# Patient Record
Sex: MALE | Race: WHITE | NOT HISPANIC OR LATINO | Employment: UNEMPLOYED | URBAN - METROPOLITAN AREA
[De-identification: names, ages, dates, MRNs, and addresses within clinical notes are randomized per-mention and may not be internally consistent; named-entity substitution may affect disease eponyms.]

---

## 2024-09-03 NOTE — PROGRESS NOTES
Ambulatory Visit  Name: Mike Monge      : 1970      MRN: [id number]  Encounter Provider: Kandy Swain DO  Encounter Date: 2024   Encounter department: [facility] PRIMARY CARE    Assessment & Plan   1. Encounter to establish care with new doctor  2. Colon cancer screening  -     Cologuard  He declines colonoscopy but is agreeable to cologuard.   3. Schizophrenia, unspecified type (HCC)  Assessment & Plan:  Sees psych (cindi santiago) at [facility]  4. Drug abuse in remission (HCC)  5. Alcohol use disorder in remission  Assessment & Plan:  Sober since .   Did AA  6. Mixed hyperlipidemia  Assessment & Plan:  On crestor.   No lab results on file  Will order lipid panel  Orders:  -     Lipid panel  -     Comprehensive metabolic panel  7. Prostate cancer screening  -     PSA Total (Reflex To Free)  8. Screening for thyroid disorder  -     TSH, 3rd generation with Free T4 reflex  9. Screening for deficiency anemia  -     CBC and differential  10. Encounter for immunization  -     Zoster Vac Recomb Adjuvanted (Shingrix) 50 MCG/0.5ML SUSR; Inject 0.5 mL into a muscle once for 1 dose Repeat dose in 2 to 6 months  11. Psoriasis  Assessment & Plan:  Rx for clobetasol sent to his pharmacy.   Orders:  -     clobetasol (TEMOVATE) 0.05 % cream; Apply topically 2 (two) times a day  12. Chronic constipation  Assessment & Plan:  On linzess and this works well for patient.          History of Present Illness     HPI    Patient is a 54 year old male who is being seen today as a new patient to establish care.   We have no records on this individual.   On multiple psychiatric medications including citalopram, benztropine, clonidine, lithium, propanolol, seroquel. Patient unsure of his diagnosis but thinks he has schizophrenia, bipolar disorder and ADHD. Sees psych (Cindi Santiago) at [facility]. Recently moved to this area and is residing at [facility]    Also on crestor so  presuming he has diagnosis of hyperlipidemia.     Previous PCP=Dr. Pina in  [place], [place]. Was reportedly there recently for a PE.     Colon cancer screening never done. He declines colonoscopy. Is agreeable to cologuard.   Immunizations--thinks his adacel is up to date. Never hand shingrix.     Has what he thinks is psoriasis. Gets rough scaly patches on elbows and hands.   Lost his glasses and has difficulty seeing right now.     Decline HIV and hep c screens. Has been screened previously    Review of Systems  History reviewed. No pertinent past medical history.  Past Surgical History:   Procedure Laterality Date   • ANTERIOR CRUCIATE LIGAMENT REPAIR Right 1994   • TONSILLECTOMY Bilateral    • WISDOM TOOTH EXTRACTION Bilateral      History reviewed. No pertinent family history.  Social History     Tobacco Use   • Smoking status: Every Day     Current packs/day: 0.50     Average packs/day: 0.5 packs/day for 40.0 years (20.0 ttl pk-yrs)     Types: Cigarettes     Start date: 9/4/1984   • Smokeless tobacco: Never   Vaping Use   • Vaping status: Former   • Substances: CBD   Substance and Sexual Activity   • Alcohol use: Not Currently     Comment: sober since 2023   • Drug use: Not Currently     Types: Marijuana, Amphetamines   • Sexual activity: Not Currently     Partners: Female     Current Outpatient Medications on File Prior to Visit   Medication Sig   • benztropine (COGENTIN) 1 mg tablet Take 1 mg by mouth daily at bedtime   • citalopram (CeleXA) 20 mg tablet Take 20 mg by mouth daily   • cloNIDine (CATAPRES) 0.1 mg tablet Take 0.1 mg by mouth every 12 (twelve) hours   • lamoTRIgine (LaMICtal) 150 MG tablet Take 150 mg by mouth 2 (two) times a day 1 tab in the am and one at bedtime   • linaCLOtide (Linzess) 145 MCG CAPS Take 145 mcg by mouth in the morning   • lithium 600 MG capsule Take 600 mg by mouth 2 (two) times a day with meals 1 tab in the am and one at bedtime   • propranolol (INDERAL) 40 mg tablet  "Take 40 mg by mouth daily at bedtime   • QUEtiapine (SEROquel) 25 mg tablet Take 25 mg by mouth 2 (two) times a day 1 tab in the am and one at bedtime   • QUEtiapine (SEROquel) 400 MG tablet Take 400 mg by mouth daily at bedtime   • rosuvastatin (CRESTOR) 5 mg tablet Take 5 mg by mouth daily at bedtime   • traZODone (DESYREL) 100 mg tablet Take 100 mg by mouth daily at bedtime     No Known Allergies  Immunization History   Administered Date(s) Administered   • COVID-19 PFIZER VACCINE 0.3 ML IM 04/24/2021, 05/23/2021, 12/29/2021   • COVID-19 Pfizer Vac BIVALENT Juan-sucrose 12 Yr+ IM 12/15/2022   • INFLUENZA 11/10/2008, 10/13/2014, 12/01/2015, 02/01/2017, 09/30/2017, 04/10/2019, 01/02/2020, 10/01/2020, 11/10/2020     Objective     /57   Pulse (!) 47   Ht 6' 4\" (1.93 m)   Wt 116 kg (255 lb)   SpO2 94%   BMI 31.04 kg/m²     Physical Exam  Vitals and nursing note reviewed.   Constitutional:       General: He is not in acute distress.     Appearance: Normal appearance. He is not ill-appearing, toxic-appearing or diaphoretic.   HENT:      Head: Normocephalic and atraumatic.      Right Ear: Tympanic membrane normal.      Left Ear: Tympanic membrane normal.      Nose: Nose normal.      Mouth/Throat:      Mouth: Mucous membranes are moist.      Pharynx: No posterior oropharyngeal erythema.   Eyes:      Conjunctiva/sclera: Conjunctivae normal.   Cardiovascular:      Rate and Rhythm: Normal rate and regular rhythm.      Heart sounds: No murmur heard.  Pulmonary:      Effort: Pulmonary effort is normal.      Breath sounds: Normal breath sounds.   Abdominal:      General: Abdomen is flat. Bowel sounds are normal.      Palpations: Abdomen is soft.   Musculoskeletal:      Cervical back: Normal range of motion and neck supple.      Right lower leg: No edema.      Left lower leg: No edema.   Lymphadenopathy:      Cervical: No cervical adenopathy.   Skin:     General: Skin is warm and dry.      Findings: Rash present.      " Comments: Dry silver plaques extensor surface of elbows and MCP joints   Neurological:      General: No focal deficit present.      Mental Status: He is alert and oriented to person, place, and time.   Psychiatric:      Comments: Affect is flat

## 2024-09-04 ENCOUNTER — OFFICE VISIT (OUTPATIENT)
Dept: FAMILY MEDICINE CLINIC | Facility: CLINIC | Age: 54
End: 2024-09-04
Payer: COMMERCIAL

## 2024-09-04 VITALS
HEART RATE: 47 BPM | DIASTOLIC BLOOD PRESSURE: 57 MMHG | OXYGEN SATURATION: 94 % | SYSTOLIC BLOOD PRESSURE: 119 MMHG | HEIGHT: 76 IN | BODY MASS INDEX: 31.05 KG/M2 | WEIGHT: 255 LBS

## 2024-09-04 DIAGNOSIS — L40.9 PSORIASIS: ICD-10-CM

## 2024-09-04 DIAGNOSIS — Z12.5 PROSTATE CANCER SCREENING: ICD-10-CM

## 2024-09-04 DIAGNOSIS — Z23 ENCOUNTER FOR IMMUNIZATION: ICD-10-CM

## 2024-09-04 DIAGNOSIS — Z76.89 ENCOUNTER TO ESTABLISH CARE WITH NEW DOCTOR: Primary | ICD-10-CM

## 2024-09-04 DIAGNOSIS — Z13.29 SCREENING FOR THYROID DISORDER: ICD-10-CM

## 2024-09-04 DIAGNOSIS — Z12.11 COLON CANCER SCREENING: ICD-10-CM

## 2024-09-04 DIAGNOSIS — F20.9 SCHIZOPHRENIA, UNSPECIFIED TYPE (HCC): ICD-10-CM

## 2024-09-04 DIAGNOSIS — K59.09 CHRONIC CONSTIPATION: ICD-10-CM

## 2024-09-04 DIAGNOSIS — F19.11 DRUG ABUSE IN REMISSION (HCC): ICD-10-CM

## 2024-09-04 DIAGNOSIS — E78.2 MIXED HYPERLIPIDEMIA: ICD-10-CM

## 2024-09-04 DIAGNOSIS — F10.91 ALCOHOL USE DISORDER IN REMISSION: ICD-10-CM

## 2024-09-04 DIAGNOSIS — Z13.0 SCREENING FOR DEFICIENCY ANEMIA: ICD-10-CM

## 2024-09-04 PROBLEM — F31.9 BIPOLAR DISORDER (HCC): Status: ACTIVE | Noted: 2024-09-04

## 2024-09-04 PROCEDURE — 99204 OFFICE O/P NEW MOD 45 MIN: CPT | Performed by: FAMILY MEDICINE

## 2024-09-04 PROCEDURE — 3725F SCREEN DEPRESSION PERFORMED: CPT | Performed by: FAMILY MEDICINE

## 2024-09-04 RX ORDER — BENZTROPINE MESYLATE 1 MG/1
1 TABLET ORAL
COMMUNITY

## 2024-09-04 RX ORDER — TRAZODONE HYDROCHLORIDE 100 MG/1
100 TABLET ORAL
COMMUNITY

## 2024-09-04 RX ORDER — QUETIAPINE FUMARATE 25 MG/1
25 TABLET, FILM COATED ORAL 2 TIMES DAILY
COMMUNITY

## 2024-09-04 RX ORDER — PROPRANOLOL HYDROCHLORIDE 40 MG/1
40 TABLET ORAL
COMMUNITY

## 2024-09-04 RX ORDER — CLONIDINE HYDROCHLORIDE 0.1 MG/1
0.1 TABLET ORAL EVERY 12 HOURS SCHEDULED
COMMUNITY

## 2024-09-04 RX ORDER — CITALOPRAM HYDROBROMIDE 20 MG/1
20 TABLET ORAL DAILY
COMMUNITY

## 2024-09-04 RX ORDER — ZOSTER VACCINE RECOMBINANT, ADJUVANTED 50 MCG/0.5
0.5 KIT INTRAMUSCULAR ONCE
Qty: 1 EACH | Refills: 1 | Status: SHIPPED | OUTPATIENT
Start: 2024-09-04 | End: 2024-09-04

## 2024-09-04 RX ORDER — LAMOTRIGINE 150 MG/1
150 TABLET ORAL 2 TIMES DAILY
COMMUNITY

## 2024-09-04 RX ORDER — QUETIAPINE FUMARATE 400 MG/1
400 TABLET, FILM COATED ORAL
COMMUNITY

## 2024-09-04 RX ORDER — LINACLOTIDE 145 UG/1
145 CAPSULE, GELATIN COATED ORAL DAILY
COMMUNITY

## 2024-09-04 RX ORDER — CLOBETASOL PROPIONATE 0.5 MG/G
CREAM TOPICAL 2 TIMES DAILY
Qty: 45 G | Refills: 1 | Status: SHIPPED | OUTPATIENT
Start: 2024-09-04

## 2024-09-04 RX ORDER — LITHIUM CARBONATE 600 MG/1
600 CAPSULE ORAL 2 TIMES DAILY WITH MEALS
COMMUNITY

## 2024-09-04 RX ORDER — ROSUVASTATIN CALCIUM 5 MG/1
5 TABLET, COATED ORAL
COMMUNITY

## 2024-09-11 ENCOUNTER — TELEPHONE (OUTPATIENT)
Dept: FAMILY MEDICINE CLINIC | Facility: CLINIC | Age: 54
End: 2024-09-11

## 2024-09-11 LAB — COLOGUARD RESULT REPORTABLE: NORMAL

## 2024-09-11 NOTE — TELEPHONE ENCOUNTER
----- Message from Kandy Swain DO sent at 9/11/2024  9:13 AM EDT -----  Please contact patient. His colougard could not be processed as stool sample exceeded allowable weight.   Will be contacted to initiate new sample collection per exact sciences.

## 2024-09-26 ENCOUNTER — TELEPHONE (OUTPATIENT)
Dept: FAMILY MEDICINE CLINIC | Facility: CLINIC | Age: 54
End: 2024-09-26

## 2024-09-26 LAB — COLOGUARD RESULT REPORTABLE: NEGATIVE

## 2024-09-26 NOTE — TELEPHONE ENCOUNTER
VERONICA Agrawal [facility] Clinical  Please notify patient:    Dr. Swain is out of the office this week. Cologuard was negative. Repeat colon cancer screening in 3 years.

## 2024-09-26 NOTE — TELEPHONE ENCOUNTER
Spoke with patient. Aware of result.    ----- Message from VERONICA Brooks sent at 9/26/2024  8:12 AM EDT -----  Please notify patient:    Dr. Swain is out of the office this week. Cologuard was negative. Repeat colon cancer screening in 3 years.   
The patient is a 38y Male complaining of altered mental status.

## 2024-12-04 ENCOUNTER — OFFICE VISIT (OUTPATIENT)
Dept: FAMILY MEDICINE CLINIC | Facility: CLINIC | Age: 54
End: 2024-12-04
Payer: COMMERCIAL

## 2024-12-04 VITALS
RESPIRATION RATE: 17 BRPM | DIASTOLIC BLOOD PRESSURE: 80 MMHG | WEIGHT: 244 LBS | SYSTOLIC BLOOD PRESSURE: 130 MMHG | TEMPERATURE: 98 F | OXYGEN SATURATION: 96 % | BODY MASS INDEX: 29.7 KG/M2 | HEART RATE: 58 BPM

## 2024-12-04 DIAGNOSIS — L40.9 PSORIASIS: Primary | ICD-10-CM

## 2024-12-04 DIAGNOSIS — F17.210 CIGARETTE NICOTINE DEPENDENCE WITHOUT COMPLICATION: ICD-10-CM

## 2024-12-04 DIAGNOSIS — F17.210 SMOKING GREATER THAN 20 PACK YEARS: ICD-10-CM

## 2024-12-04 PROCEDURE — G2211 COMPLEX E/M VISIT ADD ON: HCPCS | Performed by: FAMILY MEDICINE

## 2024-12-04 PROCEDURE — 99213 OFFICE O/P EST LOW 20 MIN: CPT | Performed by: FAMILY MEDICINE

## 2024-12-04 RX ORDER — QUETIAPINE FUMARATE 100 MG/1
100 TABLET, FILM COATED ORAL
COMMUNITY
Start: 2024-11-11

## 2024-12-04 NOTE — ASSESSMENT & PLAN NOTE
Currently using clobetasol cream and ointment with minimal improvement  Will refer him to derm for ? Biologic therapy  Orders:    Ambulatory Referral to Dermatology; Future

## 2024-12-04 NOTE — PROGRESS NOTES
Name: Mike Monge      : 1970      MRN: [id number]  Encounter Provider: Kandy Swain DO  Encounter Date: 2024   Encounter department: [facility] PRIMARY CARE    Assessment & Plan  Psoriasis  Currently using clobetasol cream and ointment with minimal improvement  Will refer him to derm for ? Biologic therapy  Orders:    Ambulatory Referral to Dermatology; Future         History of Present Illness     HPI  Patient is a 54 year old male with schizophrenia, bipolar disorder, drug abuse in remission, alcohol abuse in remission, hyperlipidemia, and chronic constipation who is being seen today for psoriasis.     Seen here as a new patient on 24.   Lab testing ordered but have not been completed.     Was given rx for clobetasol for his psoriasis. Was using it and states that it really did not help all that much. His psoriasis is located on bilateral elbows, left greater than right and on dorsum of bilateral hands. No rash on buttocks or legs.     No joint pain at all.     Review of Systems  Past Medical History:   Diagnosis Date    Alcohol abuse, in remission     Bipolar disorder (HCC)     Chronic constipation     Drug abuse in remission (HCC)     Hyperlipidemia     Psoriasis     Schizophrenia (HCC)      Past Surgical History:   Procedure Laterality Date    ANTERIOR CRUCIATE LIGAMENT REPAIR Right 1994    TONSILLECTOMY Bilateral     WISDOM TOOTH EXTRACTION Bilateral      History reviewed. No pertinent family history.  Social History     Tobacco Use    Smoking status: Every Day     Current packs/day: 0.50     Average packs/day: 0.5 packs/day for 40.2 years (20.1 ttl pk-yrs)     Types: Cigarettes     Start date: 1984    Smokeless tobacco: Never   Vaping Use    Vaping status: Former    Substances: CBD   Substance and Sexual Activity    Alcohol use: Not Currently     Comment: sober since     Drug use: Not Currently     Types: Marijuana, Amphetamines    Sexual activity: Not  Currently     Partners: Female     Current Outpatient Medications on File Prior to Visit   Medication Sig    benztropine (COGENTIN) 1 mg tablet Take 1 mg by mouth daily at bedtime    citalopram (CeleXA) 20 mg tablet Take 20 mg by mouth daily    cloNIDine (CATAPRES) 0.1 mg tablet Take 0.1 mg by mouth every 12 (twelve) hours    lamoTRIgine (LaMICtal) 150 MG tablet Take 150 mg by mouth 2 (two) times a day 1 tab in the am and one at bedtime    lithium 600 MG capsule Take 600 mg by mouth 2 (two) times a day with meals 1 tab in the am and one at bedtime    propranolol (INDERAL) 40 mg tablet Take 40 mg by mouth daily at bedtime    QUEtiapine (SEROquel) 100 mg tablet Take 100 mg by mouth daily at bedtime    QUEtiapine (SEROquel) 400 MG tablet Take 400 mg by mouth daily at bedtime    rosuvastatin (CRESTOR) 5 mg tablet Take 5 mg by mouth daily at bedtime    [DISCONTINUED] clobetasol (TEMOVATE) 0.05 % cream Apply topically 2 (two) times a day (Patient not taking: Reported on 12/4/2024)    [DISCONTINUED] linaCLOtide (Linzess) 145 MCG CAPS Take 1 capsule (145 mcg total) by mouth in the morning (Patient not taking: Reported on 12/4/2024)    [DISCONTINUED] QUEtiapine (SEROquel) 25 mg tablet Take 25 mg by mouth 2 (two) times a day 1 tab in the am and one at bedtime    [DISCONTINUED] traZODone (DESYREL) 100 mg tablet Take 100 mg by mouth daily at bedtime (Patient not taking: Reported on 12/4/2024)     Allergies   Allergen Reactions    Hydroxyzine Abdominal Pain and GI Intolerance    Erythromycin Rash     Immunization History   Administered Date(s) Administered    COVID-19 PFIZER VACCINE 0.3 ML IM 04/24/2021, 05/23/2021, 12/29/2021    COVID-19 Pfizer Vac BIVALENT Juan-sucrose 12 Yr+ IM 12/15/2022    COVID-19 Pfizer Vac BIVALENT Juan-sucrose 5 yr-11 yr IM 12/16/2022    INFLUENZA 11/10/2008, 10/13/2014, 12/01/2015, 02/01/2017, 09/30/2017, 04/10/2019, 01/02/2020, 10/01/2020, 11/10/2020    Influenza, seasonal, injectable, preservative  free 10/28/2024    Tuberculin Skin Test-PPD Intradermal 05/19/2021, 07/24/2024    Zoster Vaccine Recombinant 11/11/2024     Objective   /84 (BP Location: Left arm, Patient Position: Sitting, Cuff Size: Adult)   Pulse 58   Temp 98 °F (36.7 °C) (Tympanic)   Resp 17   Wt 111 kg (244 lb)   SpO2 96%   BMI 29.70 kg/m²     Physical Exam  Vitals and nursing note reviewed.   Constitutional:       General: He is not in acute distress.     Appearance: Normal appearance. He is not ill-appearing, toxic-appearing or diaphoretic.   Cardiovascular:      Rate and Rhythm: Normal rate and regular rhythm.      Heart sounds: No murmur heard.  Pulmonary:      Effort: Pulmonary effort is normal.      Breath sounds: Normal breath sounds.   Musculoskeletal:      Cervical back: Normal range of motion and neck supple.      Right lower leg: No edema.      Left lower leg: No edema.   Lymphadenopathy:      Cervical: No cervical adenopathy.   Skin:     Comments: Slivery scales and plaques bilateral elbows, extensor surfaces, and dorsum bilateral hands.    Psychiatric:         Mood and Affect: Mood normal.

## 2024-12-05 ENCOUNTER — TELEPHONE (OUTPATIENT)
Dept: ADMINISTRATIVE | Facility: OTHER | Age: 54
End: 2024-12-05

## 2024-12-05 NOTE — TELEPHONE ENCOUNTER
----- Message from Angela HOOD sent at 12/4/2024  2:19 PM EST -----  Regarding: Medicare Wellness HM Update  12/04/24 2:19 PM    Hello, our patient Mike Monge has had Medicare AWV completed/performed. Please assist in updating the patient chart by pulling the Care Everywhere (CE) document. The date of service is 5/30/2024.     Thank you,  Angela Simeon MA  [facility] PRIMARY CARE

## 2024-12-07 LAB
ALBUMIN SERPL-MCNC: 5 G/DL (ref 3.6–5.1)
ALBUMIN/GLOB SERPL: 1.8 (CALC) (ref 1–2.5)
ALP SERPL-CCNC: 56 U/L (ref 35–144)
ALT SERPL-CCNC: 10 U/L (ref 9–46)
AST SERPL-CCNC: 12 U/L (ref 10–35)
BASOPHILS # BLD AUTO: 47 CELLS/UL (ref 0–200)
BASOPHILS NFR BLD AUTO: 0.5 %
BILIRUB SERPL-MCNC: 0.7 MG/DL (ref 0.2–1.2)
BUN SERPL-MCNC: 16 MG/DL (ref 7–25)
BUN/CREAT SERPL: ABNORMAL (CALC) (ref 6–22)
CALCIUM SERPL-MCNC: 10.3 MG/DL (ref 8.6–10.3)
CHLORIDE SERPL-SCNC: 103 MMOL/L (ref 98–110)
CHOLEST SERPL-MCNC: 153 MG/DL
CHOLEST/HDLC SERPL: 4.9 (CALC)
CO2 SERPL-SCNC: 25 MMOL/L (ref 20–32)
CREAT SERPL-MCNC: 0.98 MG/DL (ref 0.7–1.3)
EOSINOPHIL # BLD AUTO: 177 CELLS/UL (ref 15–500)
EOSINOPHIL NFR BLD AUTO: 1.9 %
ERYTHROCYTE [DISTWIDTH] IN BLOOD BY AUTOMATED COUNT: 12 % (ref 11–15)
GFR/BSA.PRED SERPLBLD CYS-BASED-ARV: 92 ML/MIN/1.73M2
GLOBULIN SER CALC-MCNC: 2.8 G/DL (CALC) (ref 1.9–3.7)
GLUCOSE SERPL-MCNC: 104 MG/DL (ref 65–99)
HCT VFR BLD AUTO: 47.7 % (ref 38.5–50)
HDLC SERPL-MCNC: 31 MG/DL
HGB BLD-MCNC: 15.6 G/DL (ref 13.2–17.1)
LDLC SERPL CALC-MCNC: 97 MG/DL (CALC)
LYMPHOCYTES # BLD AUTO: 2390 CELLS/UL (ref 850–3900)
LYMPHOCYTES NFR BLD AUTO: 25.7 %
MCH RBC QN AUTO: 29.2 PG (ref 27–33)
MCHC RBC AUTO-ENTMCNC: 32.7 G/DL (ref 32–36)
MCV RBC AUTO: 89.3 FL (ref 80–100)
MONOCYTES # BLD AUTO: 809 CELLS/UL (ref 200–950)
MONOCYTES NFR BLD AUTO: 8.7 %
NEUTROPHILS # BLD AUTO: 5878 CELLS/UL (ref 1500–7800)
NEUTROPHILS NFR BLD AUTO: 63.2 %
NONHDLC SERPL-MCNC: 122 MG/DL (CALC)
PLATELET # BLD AUTO: 314 THOUSAND/UL (ref 140–400)
PMV BLD REES-ECKER: 10.3 FL (ref 7.5–12.5)
POTASSIUM SERPL-SCNC: 4.3 MMOL/L (ref 3.5–5.3)
PROT SERPL-MCNC: 7.8 G/DL (ref 6.1–8.1)
PSA SERPL-MCNC: 0.8 NG/ML
RBC # BLD AUTO: 5.34 MILLION/UL (ref 4.2–5.8)
SODIUM SERPL-SCNC: 138 MMOL/L (ref 135–146)
TRIGL SERPL-MCNC: 149 MG/DL
TSH SERPL-ACNC: 1.5 MIU/L (ref 0.4–4.5)
WBC # BLD AUTO: 9.3 THOUSAND/UL (ref 3.8–10.8)

## 2024-12-09 ENCOUNTER — RESULTS FOLLOW-UP (OUTPATIENT)
Dept: FAMILY MEDICINE CLINIC | Facility: CLINIC | Age: 54
End: 2024-12-09

## 2024-12-09 ENCOUNTER — TELEPHONE (OUTPATIENT)
Age: 54
End: 2024-12-09

## 2024-12-09 DIAGNOSIS — E78.2 MIXED HYPERLIPIDEMIA: Primary | ICD-10-CM

## 2024-12-09 RX ORDER — ROSUVASTATIN CALCIUM 5 MG/1
5 TABLET, COATED ORAL
Qty: 90 TABLET | Refills: 1 | Status: SHIPPED | OUTPATIENT
Start: 2024-12-09

## 2024-12-09 NOTE — TELEPHONE ENCOUNTER
ALEJANDRO Swain,    Pharmacy called [facility] for maintenance med. Refills for the patient. Rula stated since Patient is seeing Dr. Swain now the refills needs to come from her office.    Thank you!!

## 2024-12-09 NOTE — TELEPHONE ENCOUNTER
Reason for call:   [x] Refill   [] Prior Auth  [] Other:     Office: [facility] Primary Care  [x] PCP/Provider - Kandy Swain   [] Specialty/Provider -     Medication: rosuvastatin     Dose/Frequency: 5 mg/ daily     Quantity: 30 day supply     Pharmacy: [facility] in [place]     Does the patient have enough for 3 days?   [] Yes   [x] No - Send as HP to POD

## 2024-12-10 RX ORDER — TRAZODONE HYDROCHLORIDE 100 MG/1
100 TABLET ORAL
COMMUNITY
Start: 2024-12-06 | End: 2024-12-12 | Stop reason: SDUPTHER

## 2024-12-10 RX ORDER — HYDROXYZINE PAMOATE 25 MG/1
25 CAPSULE ORAL 3 TIMES DAILY PRN
COMMUNITY
Start: 2024-12-06

## 2024-12-10 NOTE — TELEPHONE ENCOUNTER
Pt aware of results, verbalized understanding and requested for results to be sent by mail.    ----- Message from Kandy Swain DO sent at 12/9/2024  3:04 PM EST -----  Can let patient know that his labs looked good overall.   Cholesterol was good at 153. HDL (good cholesterol) was a little low for which exercise is recommended.   Blood sugar slightly elevated at 104 and recommend watching sweets and simple carbs in diet.

## 2024-12-10 NOTE — TELEPHONE ENCOUNTER
This patient does not have diagnosis of hypertension  Reviewed previous PCP note  He is on inderal LA as prescribed by previous PCP for his tremor. This medication is a beta blocker which can be used for blood pressure but that is not why he is on it.   He is also on clonidine (presumably for anxiety) which can also be used for blood pressure but again, no diagnosis of hypertension on chart     Please find out what medication he needs by name.

## 2024-12-11 NOTE — TELEPHONE ENCOUNTER
Upon review of the In Basket request we were able to locate, review, and update the patient chart as requested for Medicare AW.    Any additional questions or concerns should be emailed to the Practice Liaisons via the appropriate education email address, please do not reply via In Basket.    Thank you  Michael Gomez MA   [facility]

## 2024-12-12 ENCOUNTER — TELEPHONE (OUTPATIENT)
Age: 54
End: 2024-12-12

## 2024-12-12 DIAGNOSIS — G47.00 INSOMNIA, UNSPECIFIED TYPE: Primary | ICD-10-CM

## 2024-12-12 RX ORDER — TRAZODONE HYDROCHLORIDE 100 MG/1
100 TABLET ORAL
Start: 2024-12-12

## 2024-12-12 NOTE — TELEPHONE ENCOUNTER
Pt was contacted regarding concerns with blood pressure medication, pt state that if Dr. Swain is not concerned that he does not need them.

## 2024-12-12 NOTE — TELEPHONE ENCOUNTER
Pt was notified that his prescription will have to come from his psych provider. Pt expressed frustration regarding this message. Pt is aware that he should contact psych provider for refills.

## 2024-12-12 NOTE — TELEPHONE ENCOUNTER
It was removed because he noted that he was NOT TAKING  at time of visit.   If indeed he is still taking will make sure it gets put back on med list.

## 2024-12-12 NOTE — TELEPHONE ENCOUNTER
Pt was contacted to discuss refill for Trazadone, pt stated that he really needs this medication because he takes it every night.

## 2024-12-12 NOTE — TELEPHONE ENCOUNTER
Just to clarify, he should continue his inderal LA and clonidine both of which are being prescribed for him but not for diagnosis of hypertension.

## 2024-12-12 NOTE — TELEPHONE ENCOUNTER
Patient called the RX Refill Line. Message is being forwarded to the office.     Patient is concerned because his after visit summary from his appointment with Dr. Swain on 12/04/24 states to STOP taking trazodone. He said he's been on this medication since his late twenties and needs to stay on it for sleep. Patient doesn't recall discussing this medication with Dr. Swain and it is managed by his current psychiatrist.    Patient is requesting a follow-up phone call to discuss. Please contact patient at phone #[phone]

## 2024-12-16 ENCOUNTER — HOSPITAL ENCOUNTER (OUTPATIENT)
Dept: CT IMAGING | Facility: HOSPITAL | Age: 54
Discharge: HOME | End: 2024-12-16
Attending: FAMILY MEDICINE

## 2024-12-16 DIAGNOSIS — F17.210 SMOKING GREATER THAN 20 PACK YEARS: ICD-10-CM

## 2024-12-16 DIAGNOSIS — F17.210 CIGARETTE NICOTINE DEPENDENCE WITHOUT COMPLICATION: ICD-10-CM

## 2024-12-23 ENCOUNTER — RESULTS FOLLOW-UP (OUTPATIENT)
Dept: FAMILY MEDICINE CLINIC | Facility: CLINIC | Age: 54
End: 2024-12-23

## 2024-12-23 DIAGNOSIS — F17.210 SMOKING GREATER THAN 20 PACK YEARS: Primary | ICD-10-CM

## 2024-12-23 DIAGNOSIS — I77.810 THORACIC AORTIC ECTASIA (HCC): ICD-10-CM

## 2024-12-23 NOTE — TELEPHONE ENCOUNTER
Pt made aware of test results and recommendation, pt verbalized understanding.     ----- Message from Kandy Swain DO sent at 12/23/2024 12:00 PM EST -----  Let patient know that his CT done for lung cancer screening did not show any lung nodules   He did however have slight dilation of the thoracic aorta at 4.3 cm. Repeat imaging in one year is recommended.

## 2025-04-14 ENCOUNTER — TELEPHONE (OUTPATIENT)
Dept: PSYCHIATRY | Facility: CLINIC | Age: 55
End: 2025-04-14

## 2025-04-14 ENCOUNTER — TELEPHONE (OUTPATIENT)
Age: 55
End: 2025-04-14

## 2025-04-14 NOTE — TELEPHONE ENCOUNTER
LVM regarding getting set up for NP talk therapy and psychiatry.  Clt resides at [facility].

## 2025-04-14 NOTE — TELEPHONE ENCOUNTER
Patient returned call to speak with Barbara Jack regarding an appointment. Writer transferred call and LVM to call Pt back. [facility] also sent to Barbara.

## 2025-04-15 ENCOUNTER — TELEPHONE (OUTPATIENT)
Dept: PSYCHIATRY | Facility: CLINIC | Age: 55
End: 2025-04-15

## 2025-04-15 NOTE — TELEPHONE ENCOUNTER
"Behavioral Health Outpatient Intake Questions    Referred By   : Eric Romo    Please advise interviewee that they need to answer all questions truthfully to allow for best care, and any misrepresentations of information may affect their ability to be seen at this clinic   => Was this discussed? Yes     If Minor Child (under age 18)    Who is/are the legal guardian(s) of the child?     Is there a custody agreement? No     If \"YES\"- Custody orders must be obtained prior to scheduling the first appointment  In addition, Consent to Treatment must be signed by all legal guardians prior to scheduling the first appointment    If \"NO\"- Consent to Treatment must be signed by all legal guardians prior to scheduling the first appointment    Behavioral Health Outpatient Intake History -     Presenting Problem (in patient's own words): Follow up for med mgmt and therapy.  Moved into [facility]    Are there any communication barriers for this patient?     No                                               If yes, please describe barriers:   If there is a unique situation, please refer to Bryant Lepe/Lori Cortez for final determination.    Are you taking any psychiatric medications? Yes     If \"YES\" -What are they will bring a list    If \"YES\" -Who prescribes? [facility]    Has the Patient previously received outpatient Talk Therapy or Medication Management from [facility]  No        If \"YES\"- When, Where and with Whom?         If \"NO\" -Has Patient received these services elsewhere?       If \"YES\" -When, Where, and with Whom?    Has the Patient abused alcohol or other substances in the last 6 months ? No       If \"YES\" -What substance, How much, How often?     If illegal substance: Refer to [facility] (for LUIS A) or [facility].   If Alcohol in excess of 10 drinks per week:  Refer to [facility] (for LUIS A) or [facility]    Legal History-     Is this treatment court ordered? No   If \"yes \"send to :  Talk Therapy " ": Send to Bryant Lepe for final determination   Med Management: Send to Dr. Kramer for final determination     Has the Patient been convicted of a felony?  No   If \"Yes\" send to -When, What?  Talk Therapy: Send to Bryant Lepe for final determination   Med Management: Send to Dr. Kramer for final determination     ACCEPTED as a patient Yes  If \"Yes\" Appointment Date: 4/17/25    Referred Elsewhere? No  If “Yes” - (Where? Ex: [facility], [facility], [facility], [facility], etc.)   Name of Insurance Co:  [employer]are   Insurance ID#  Insurance Phone #  If ins is primary or secondary?  If patient is a minor, parents information such as Name, D.O.B of guarantor.  "

## 2025-04-17 ENCOUNTER — TELEPHONE (OUTPATIENT)
Dept: PSYCHIATRY | Facility: CLINIC | Age: 55
End: 2025-04-17

## 2025-04-17 ENCOUNTER — OFFICE VISIT (OUTPATIENT)
Dept: BEHAVIORAL/MENTAL HEALTH CLINIC | Facility: CLINIC | Age: 55
End: 2025-04-17
Payer: COMMERCIAL

## 2025-04-17 DIAGNOSIS — F20.9 SCHIZOPHRENIA, UNSPECIFIED TYPE (HCC): Primary | ICD-10-CM

## 2025-04-17 PROCEDURE — 90791 PSYCH DIAGNOSTIC EVALUATION: CPT | Performed by: COUNSELOR

## 2025-04-17 NOTE — TELEPHONE ENCOUNTER
Client called for directions from [facility]. Client knows where the [facility] is, writer informed client our building is to the left in the big parking lot that is on the left side of the driveway going past [facility].    Client asked in relation to the Rehab. Writer told client that building was continuing past the [facility] to the right.
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
## 2025-04-21 ENCOUNTER — TELEPHONE (OUTPATIENT)
Age: 55
End: 2025-04-21

## 2025-04-21 NOTE — TELEPHONE ENCOUNTER
Mike Monge and/or patient requested a call back to discuss patient stated he has some issues he would like to discuss with provider and would like a call back as soon as possible.    They can be reached at P# [phone].       Thank you.

## 2025-04-22 ENCOUNTER — TELEPHONE (OUTPATIENT)
Dept: BEHAVIORAL/MENTAL HEALTH CLINIC | Facility: CLINIC | Age: 55
End: 2025-04-22

## 2025-04-22 ENCOUNTER — TELEPHONE (OUTPATIENT)
Dept: PSYCHIATRY | Facility: CLINIC | Age: 55
End: 2025-04-22

## 2025-04-22 NOTE — TELEPHONE ENCOUNTER
One week follow up call for New Patient appointment with Kitty Miranda [[id number]] on 5/15/25  was made on 4/15/25. Writer informed patient of New Patient paperwork needing to be completed 5 days prior to the appointment. Writer confirmed paperwork has been sent via Mailed.

## 2025-05-08 ENCOUNTER — OFFICE VISIT (OUTPATIENT)
Dept: BEHAVIORAL/MENTAL HEALTH CLINIC | Facility: CLINIC | Age: 55
End: 2025-05-08
Payer: COMMERCIAL

## 2025-05-08 DIAGNOSIS — F10.91 ALCOHOL USE DISORDER IN REMISSION: ICD-10-CM

## 2025-05-08 DIAGNOSIS — F19.11 DRUG ABUSE IN REMISSION (HCC): ICD-10-CM

## 2025-05-08 DIAGNOSIS — F20.9 SCHIZOPHRENIA, UNSPECIFIED TYPE (HCC): Primary | ICD-10-CM

## 2025-05-08 PROCEDURE — 90834 PSYTX W PT 45 MINUTES: CPT | Performed by: COUNSELOR

## 2025-05-08 NOTE — BH CRISIS PLAN
Client Name: Mike Monge       Client YOB: 1970    Jozef Safety Plan      Creation Date: 5/8/25 Update Date: 5/8/26   Created By: Ginette Peres Last Updated By: Ginette Peres      Step 1: Warning Signs:   Warning Signs   anger   voices   isolate            Step 2: Internal Coping Strategies:   Internal Coping Strategies   isloate   smoke cigarette   walks            Step 3: People and social settings that provide distraction:   Name Contact Information   my old partner cell phone in phone    Places   none           Step 4: People whom I can ask for help during a crisis:     Patient did not identify any contacts: Yes      Step 5: Professionals or agencies I can contact during a crisis:      Clinican/Agency Name Phone Emergency Contact    [facility] [phone]       [facility] Emergency Department Emergency Department Phone Emergency Department Address    [facility] [phone]         Crisis Phone Numbers:   Suicide Prevention Lifeline: Call or Text  470 Crisis Text Line: Text HOME to 227-071   Please note: Some [facility] do not have a separate number for Child/Adolescent specific crisis. If your county is not listed under Child/Adolescent, please call the adult number for your county      Adult Crisis Numbers: Child/Adolescent Crisis Numbers   [facility]: [phone] [facility]: [phone]   [facility]: [phone] [facility]: [phone]   [facility]: [phone] [place]: [phone]   [facility]: [phone] [facility]: [phone]   [facility]: [phone]   [facility]: [phone]   [facility]: [phone]   [facility]: [phone] (daytime) [phone] (after hours, weekends, holidays)      Step 6: Making the environment safer (plan for lethal means safety):   Patient did not identify any lethal methods: Yes     Optional: What is most important to me and worth living for?   My  strength     Jozef Safety Plan. Alexia Sellers and Parag Owusu. Used with permission of the authors.

## 2025-05-08 NOTE — PSYCH
"Behavioral Health Psychotherapy Progress Note    Psychotherapy Provided: Individual Psychotherapy     1. Schizophrenia, unspecified type (HCC)        2. 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 Alcohol use disorder in remission            Goals addressed in session: Goal 1     DATA: Client endorses aggravation with new roommate and processed feelings. Client endorses hearing voices telling him he's going to get in trouble for using his friends food stamp card. Client is struggling to prove that these voices exist. Client is observed to be emotionally dysregulated in thoughts, shaking hands and spiraling feelings of paranoia.  Client declines to do a PHQ but is agreeable to work on a treatment plan. Client processed on how hard his living situation is and that hearing these voices makes it worse.  Client wishes that the voices would go away and stop bothering him. Client processed on his sobriety and fearing he will relapse due to the dysregulation.  Client is hopeful medications will be help him.  Client is somewhat receptive to challenging voices with facts or using mantras.    During this session, this clinician used the following therapeutic modalities: Mindfulness-based Strategies and CTR    Substance Abuse was addressed during this session. If the client is diagnosed with a co-occurring substance use disorder, please indicate any changes in the frequency or amount of use: NONE. Stage of change for addressing substance use diagnoses: Maintenance    ASSESSMENT:  Mike Monge presents with a Anxious mood.     his affect is Normal range and intensity and Tearful, which is congruent, with his mood and the content of the session. The client has made progress on their goals.    Client created goals today.  Mike Monge presents with a none risk of suicide, none risk of self-harm, and none risk of harm to others.    For any risk assessment that surpasses a \"low\" rating, a safety plan must be developed.    A safety " "plan was indicated: no  If yes, describe in detail NA    PLAN: Between sessions, Mike oMnge will try using \"no news is good news\" mantra. At the next session, the therapist will use Client-centered Therapy, Mindfulness-based Strategies, and CTR  to address schizophrenia, depression.    Behavioral Health Treatment Plan and Discharge Planning: Mike Monge is aware of and agrees to continue to work on their treatment plan. They have identified and are working toward their discharge goals. yes    Depression Follow-up Plan Completed: Not applicable    Visit start and stop times:    05/08/25  Start Time: 1200  Stop Time: 1248  Total Visit Time: 48 minutes  "

## 2025-05-08 NOTE — BH TREATMENT PLAN
"Outpatient Behavioral Health Psychotherapy Treatment Plan    Mike Monge  1970     Date of Initial Psychotherapy Assessment: 4/17/25   Date of Current Treatment Plan: 05/08/25  Treatment Plan Target Date: TBD  Treatment Plan Expiration Date: 11/17/25    Diagnosis:   1. Schizophrenia, unspecified type (HCC)        2. 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 Alcohol use disorder in remission            Area(s) of Need: Sobriety,  paranoia, emotional regulation, skills    Long Term Goal 1 (in the client's own words): I want to get rid of the baggage in life and get back to my life.  I want to learn how to \"be me\" again.     Stage of Change: Action    Target Date for completion: TBD     Anticipated therapeutic modalities: DBT skills, CTR, Mindfulness     People identified to complete this goal: Mike Monge, Ginette Peres, PHILW      Objective 1: (identify the means of measuring success in meeting the objective): I would like to get a new apartment.      Objective 2: (identify the means of measuring success in meeting the objective): In 6 months I will learn 3 new emotional regulation skills.       I am currently under the care of a [facility] psychiatric provider: no    My [facility] psychiatric provider is: None    I am currently taking psychiatric medications:  yes as directed.     I feel that I will be ready for discharge from mental health care when I reach the following (measurable goal/objective): Once my life feels more in line with what I want.     For children and adults who have a legal guardian:   Has there been any change to custody orders and/or guardianship status? NA. If yes, attach updated documentation.    I have created my Crisis Plan and have been offered a copy of this plan    Behavioral Health Treatment Plan  Luke: Diagnosis and Treatment Plan explained to Mike Monge acknowledges an understanding of their diagnosis. Mike Monge agrees to this treatment plan.    I " have been offered a copy of this Treatment Plan. yes

## 2025-05-09 PROBLEM — H90.3 SENSORINEURAL HEARING LOSS (SNHL) OF BOTH EARS: Status: ACTIVE | Noted: 2021-03-09

## 2025-05-09 PROBLEM — F51.01 PRIMARY INSOMNIA: Status: ACTIVE | Noted: 2021-03-09

## 2025-05-09 PROBLEM — G47.33 OBSTRUCTIVE SLEEP APNEA SYNDROME: Status: ACTIVE | Noted: 2022-01-04

## 2025-05-09 PROBLEM — M47.816 SPONDYLOSIS OF LUMBAR SPINE: Status: ACTIVE | Noted: 2021-03-09

## 2025-05-09 PROBLEM — Z71.89 ADVANCED CARE PLANNING/COUNSELING DISCUSSION: Status: ACTIVE | Noted: 2022-01-05

## 2025-05-09 PROBLEM — D45 POLYCYTHEMIA VERA (HCC): Chronic | Status: ACTIVE | Noted: 2021-03-09

## 2025-05-09 PROBLEM — M19.042 PRIMARY OSTEOARTHRITIS OF BOTH HANDS: Status: ACTIVE | Noted: 2021-03-09

## 2025-05-09 PROBLEM — K59.09 CHRONIC CONSTIPATION: Status: ACTIVE | Noted: 2023-07-03

## 2025-05-09 PROBLEM — R40.0 DAYTIME SOMNOLENCE: Status: ACTIVE | Noted: 2021-03-09

## 2025-05-09 PROBLEM — F31.62 BIPOLAR DISORDER, CURRENT EPISODE MIXED, MODERATE (HCC): Chronic | Status: ACTIVE | Noted: 2021-03-09

## 2025-05-09 PROBLEM — R06.83 PRIMARY SNORING: Status: ACTIVE | Noted: 2021-03-09

## 2025-05-09 PROBLEM — I10 BENIGN ESSENTIAL HYPERTENSION: Status: ACTIVE | Noted: 2021-03-09

## 2025-05-09 PROBLEM — R06.81 WITNESSED EPISODE OF APNEA: Status: ACTIVE | Noted: 2021-03-09

## 2025-05-09 PROBLEM — H02.66 XANTHELASMA OF EYELID, BILATERAL: Status: ACTIVE | Noted: 2023-07-03

## 2025-05-09 PROBLEM — L40.8 OTHER PSORIASIS: Status: ACTIVE | Noted: 2023-07-03

## 2025-05-09 PROBLEM — R73.01 IMPAIRED FASTING GLUCOSE: Status: ACTIVE | Noted: 2021-03-09

## 2025-05-09 PROBLEM — F17.210 CIGARETTE NICOTINE DEPENDENCE WITHOUT COMPLICATION: Status: ACTIVE | Noted: 2022-01-05

## 2025-05-09 PROBLEM — I83.812 VARICOSE VEINS OF LEFT LOWER EXTREMITY WITH PAIN: Status: ACTIVE | Noted: 2022-01-05

## 2025-05-09 PROBLEM — N52.9 ERECTILE DYSFUNCTION: Status: ACTIVE | Noted: 2021-03-09

## 2025-05-09 PROBLEM — F32.1 CURRENT MODERATE EPISODE OF MAJOR DEPRESSIVE DISORDER (HCC): Status: ACTIVE | Noted: 2021-03-09

## 2025-05-09 PROBLEM — F19.11 HISTORY OF DRUG ABUSE (HCC): Status: ACTIVE | Noted: 2022-01-05

## 2025-05-09 PROBLEM — Z12.11 COLON CANCER SCREENING: Status: ACTIVE | Noted: 2023-01-09

## 2025-05-09 PROBLEM — R61 CHRONIC NIGHT SWEATS: Status: ACTIVE | Noted: 2022-01-05

## 2025-05-09 PROBLEM — H02.63 XANTHELASMA OF EYELID, BILATERAL: Status: ACTIVE | Noted: 2023-07-03

## 2025-05-09 PROBLEM — Z72.52 HIGH RISK HOMOSEXUAL BEHAVIOR: Status: ACTIVE | Noted: 2021-03-09

## 2025-05-09 PROBLEM — M19.041 PRIMARY OSTEOARTHRITIS OF BOTH HANDS: Status: ACTIVE | Noted: 2021-03-09

## 2025-05-09 PROBLEM — M47.812 OSTEOARTHRITIS OF CERVICAL SPINE: Status: ACTIVE | Noted: 2021-03-09

## 2025-05-09 PROBLEM — F29 PSYCHOSIS (HCC): Status: ACTIVE | Noted: 2020-03-07

## 2025-05-09 PROBLEM — Z00.01 ENCOUNTER FOR GENERAL ADULT MEDICAL EXAMINATION WITH ABNORMAL FINDINGS: Status: ACTIVE | Noted: 2022-01-05

## 2025-05-09 RX ORDER — TRAZODONE HYDROCHLORIDE 150 MG/1
TABLET ORAL
COMMUNITY
Start: 2025-04-18 | End: 2025-05-15 | Stop reason: SDUPTHER
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
## 2025-05-15 ENCOUNTER — TELEPHONE (OUTPATIENT)
Dept: PSYCHIATRY | Facility: CLINIC | Age: 55
End: 2025-05-15

## 2025-05-15 ENCOUNTER — OFFICE VISIT (OUTPATIENT)
Dept: PSYCHIATRY | Facility: CLINIC | Age: 55
End: 2025-05-15
Payer: COMMERCIAL

## 2025-05-15 DIAGNOSIS — F10.91 ALCOHOL USE DISORDER IN REMISSION: ICD-10-CM

## 2025-05-15 DIAGNOSIS — F32.1 CURRENT MODERATE EPISODE OF MAJOR DEPRESSIVE DISORDER, UNSPECIFIED WHETHER RECURRENT (HCC): ICD-10-CM

## 2025-05-15 DIAGNOSIS — F19.11 HISTORY OF DRUG ABUSE (HCC): Primary | ICD-10-CM

## 2025-05-15 DIAGNOSIS — F39 MOOD DISORDER (HCC): ICD-10-CM

## 2025-05-15 DIAGNOSIS — F41.9 ANXIETY: ICD-10-CM

## 2025-05-15 PROCEDURE — 90792 PSYCH DIAG EVAL W/MED SRVCS: CPT

## 2025-05-15 RX ORDER — LITHIUM CARBONATE 600 MG/1
600 CAPSULE ORAL 2 TIMES DAILY WITH MEALS
Qty: 60 CAPSULE | Refills: 1 | Status: SHIPPED | OUTPATIENT
Start: 2025-05-15

## 2025-05-15 RX ORDER — BENZTROPINE MESYLATE 1 MG/1
1 TABLET ORAL
Qty: 30 TABLET | Refills: 1 | Status: SHIPPED | OUTPATIENT
Start: 2025-05-15

## 2025-05-15 RX ORDER — CLONIDINE HYDROCHLORIDE 0.1 MG/1
0.1 TABLET ORAL EVERY 12 HOURS SCHEDULED
Qty: 30 TABLET | Refills: 0 | Status: SHIPPED | OUTPATIENT
Start: 2025-05-15

## 2025-05-15 RX ORDER — QUETIAPINE FUMARATE 400 MG/1
400 TABLET, FILM COATED ORAL
Qty: 30 TABLET | Refills: 0 | Status: SHIPPED | OUTPATIENT
Start: 2025-05-15

## 2025-05-15 RX ORDER — CITALOPRAM HYDROBROMIDE 20 MG/1
20 TABLET ORAL DAILY
Qty: 30 TABLET | Refills: 1 | Status: SHIPPED | OUTPATIENT
Start: 2025-05-15

## 2025-05-15 RX ORDER — LAMOTRIGINE 150 MG/1
150 TABLET ORAL 2 TIMES DAILY
Qty: 60 TABLET | Refills: 1 | Status: SHIPPED | OUTPATIENT
Start: 2025-05-15

## 2025-05-15 RX ORDER — HYDROXYZINE PAMOATE 50 MG/1
50 CAPSULE ORAL 3 TIMES DAILY PRN
Qty: 30 CAPSULE | Refills: 1 | Status: SHIPPED | OUTPATIENT
Start: 2025-05-15

## 2025-05-15 RX ORDER — TRAZODONE HYDROCHLORIDE 150 MG/1
150 TABLET ORAL
Qty: 30 TABLET | Refills: 1 | Status: SHIPPED | OUTPATIENT
Start: 2025-05-15

## 2025-05-15 NOTE — ASSESSMENT & PLAN NOTE
-Continue clonidine 0.1 mg every 12 hours for anxiety   - Celexa 20 mg daily for anxiety and depression  Orders:    hydrOXYzine pamoate (VISTARIL) 50 mg capsule; Take 1 capsule (50 mg total) by mouth 3 (three) times a day as needed for itching    cloNIDine (CATAPRES) 0.1 mg tablet; Take 1 tablet (0.1 mg total) by mouth every 12 (twelve) hours    citalopram (CeleXA) 20 mg tablet; Take 1 tablet (20 mg total) by mouth in the morning.

## 2025-05-15 NOTE — ASSESSMENT & PLAN NOTE
- Continue Lamictal 150 mg twice daily for mood   - Continue lithium 600 mg twice daily for mood   -Continue Cogentin 1 mg daily at bedtime for side effects   -Continue Trazodone 150 mg daily at bedtime for sleep   -Continue Seroquel 400 mg daily at bedtime for mood/hallucinations  Orders:    traZODone (DESYREL) 150 mg tablet; Take 1 tablet (150 mg total) by mouth daily at bedtime as needed for sleep    QUEtiapine (SEROquel) 400 MG tablet; Take 1 tablet (400 mg total) by mouth daily at bedtime    lithium 600 MG capsule; Take 1 capsule (600 mg total) by mouth in the morning and 1 capsule (600 mg total) in the evening. Take with meals. 1 tab in the am and one at bedtime.    lamoTRIgine (LaMICtal) 150 MG tablet; Take 1 tablet (150 mg total) by mouth in the morning and 1 tablet (150 mg total) before bedtime. 1 tab in the am and one at bedtime.    citalopram (CeleXA) 20 mg tablet; Take 1 tablet (20 mg total) by mouth in the morning.    benztropine (COGENTIN) 1 mg tablet; Take 1 tablet (1 mg total) by mouth daily at bedtime

## 2025-05-15 NOTE — TELEPHONE ENCOUNTER
Writer called client as provider specified that she would like the therapy notes and psych evaluation notes if client is willing to release the information.    Client plans to stop by tomorrow (5/16/25) to sign a new release.

## 2025-06-02 ENCOUNTER — RA CDI HCC (OUTPATIENT)
Dept: OTHER | Facility: HOSPITAL | Age: 55
End: 2025-06-02

## 2025-06-04 PROBLEM — Z12.11 COLON CANCER SCREENING: Status: RESOLVED | Noted: 2023-01-09 | Resolved: 2025-06-04

## 2025-06-04 PROBLEM — Z71.89 ADVANCED CARE PLANNING/COUNSELING DISCUSSION: Status: RESOLVED | Noted: 2022-01-05 | Resolved: 2025-06-04

## 2025-06-04 PROBLEM — Z00.01 ENCOUNTER FOR GENERAL ADULT MEDICAL EXAMINATION WITH ABNORMAL FINDINGS: Status: RESOLVED | Noted: 2022-01-05 | Resolved: 2025-06-04

## 2025-06-04 NOTE — PROGRESS NOTES
Patient is a 55 year old male with hypertension, hyperlipidemia, ANNI, psoriasis, bipolar disorder, alcohol abuse in remission, history of drug abuse, polycythemia vera, cigarette dependence here today for annual medicare wellness exam.     Colon cancer screening is up to date (cologuard 9/18/24)  Lung cancer screening CT up to date (12/16/24)  Shingrix is up to date  Due for adacel and pneumococcal vaccines.

## 2025-06-05 ENCOUNTER — TELEPHONE (OUTPATIENT)
Age: 55
End: 2025-06-05

## 2025-06-05 ENCOUNTER — OFFICE VISIT (OUTPATIENT)
Dept: FAMILY MEDICINE CLINIC | Facility: CLINIC | Age: 55
End: 2025-06-05
Payer: COMMERCIAL

## 2025-06-05 VITALS
WEIGHT: 223.6 LBS | BODY MASS INDEX: 27.22 KG/M2 | SYSTOLIC BLOOD PRESSURE: 127 MMHG | OXYGEN SATURATION: 97 % | DIASTOLIC BLOOD PRESSURE: 81 MMHG | HEART RATE: 79 BPM

## 2025-06-05 DIAGNOSIS — F31.62 BIPOLAR DISORDER, CURRENT EPISODE MIXED, MODERATE (HCC): Chronic | ICD-10-CM

## 2025-06-05 DIAGNOSIS — I10 BENIGN ESSENTIAL HYPERTENSION: ICD-10-CM

## 2025-06-05 DIAGNOSIS — E78.2 MIXED HYPERLIPIDEMIA: ICD-10-CM

## 2025-06-05 DIAGNOSIS — K59.09 CHRONIC CONSTIPATION: ICD-10-CM

## 2025-06-05 DIAGNOSIS — Z12.5 SCREENING FOR PROSTATE CANCER: ICD-10-CM

## 2025-06-05 DIAGNOSIS — I83.812 VARICOSE VEINS OF LEFT LOWER EXTREMITY WITH PAIN: ICD-10-CM

## 2025-06-05 DIAGNOSIS — F17.210 CIGARETTE NICOTINE DEPENDENCE WITHOUT COMPLICATION: ICD-10-CM

## 2025-06-05 DIAGNOSIS — I77.810 THORACIC AORTIC ECTASIA (HCC): ICD-10-CM

## 2025-06-05 DIAGNOSIS — Z13.29 SCREENING FOR THYROID DISORDER: ICD-10-CM

## 2025-06-05 DIAGNOSIS — R73.01 IMPAIRED FASTING GLUCOSE: ICD-10-CM

## 2025-06-05 DIAGNOSIS — L40.8 OTHER PSORIASIS: ICD-10-CM

## 2025-06-05 DIAGNOSIS — Z23 ENCOUNTER FOR IMMUNIZATION: ICD-10-CM

## 2025-06-05 DIAGNOSIS — G47.33 OBSTRUCTIVE SLEEP APNEA SYNDROME: ICD-10-CM

## 2025-06-05 DIAGNOSIS — D45 POLYCYTHEMIA VERA (HCC): Chronic | ICD-10-CM

## 2025-06-05 DIAGNOSIS — Z00.00 MEDICARE ANNUAL WELLNESS VISIT, SUBSEQUENT: Primary | ICD-10-CM

## 2025-06-05 DIAGNOSIS — R25.1 TREMOR: ICD-10-CM

## 2025-06-05 DIAGNOSIS — Z72.52 HIGH RISK HOMOSEXUAL BEHAVIOR: ICD-10-CM

## 2025-06-05 PROCEDURE — G0009 ADMIN PNEUMOCOCCAL VACCINE: HCPCS

## 2025-06-05 PROCEDURE — 90677 PCV20 VACCINE IM: CPT

## 2025-06-05 PROCEDURE — G0438 PPPS, INITIAL VISIT: HCPCS | Performed by: FAMILY MEDICINE

## 2025-06-05 PROCEDURE — G2211 COMPLEX E/M VISIT ADD ON: HCPCS | Performed by: FAMILY MEDICINE

## 2025-06-05 PROCEDURE — 99214 OFFICE O/P EST MOD 30 MIN: CPT | Performed by: FAMILY MEDICINE

## 2025-06-05 NOTE — ASSESSMENT & PLAN NOTE
Seeing dermatology at [facility]  Just got a new medication yesterday. Not sure of name and will message me so we can add to med list.

## 2025-06-05 NOTE — ASSESSMENT & PLAN NOTE
BP is currently at goal. On no meds specifically for his hypertension  He is being prescribed clonidine by his psychiatrist.

## 2025-06-05 NOTE — ASSESSMENT & PLAN NOTE
Previously on linzess which was ineffective  Also tried miralax which did not help.   Using ex-lax prn currently

## 2025-06-05 NOTE — ASSESSMENT & PLAN NOTE
Discussed smoking cessation  Has tried nicotine gum with no success.   Wondering about chantix.   Advised that due to potential affect on moods, should first discuss with his psychiatrist

## 2025-06-05 NOTE — ASSESSMENT & PLAN NOTE
Sleep study done several years ago. Not sure where it was done.   Cpap reportedly recommended but he declined as he was unable to tolerate it  He declines re-visiting this today.   Aware of potential risks of untreated ANNI

## 2025-06-05 NOTE — PROGRESS NOTES
"Name: Mike Monge      : 1970      MRN: [id number]  Encounter Provider: Kandy Swain DO  Encounter Date: 2025   Encounter department: [facility] PRIMARY CARE  :  Assessment & Plan  Medicare annual wellness visit, subsequent         Mixed hyperlipidemia  On crestor 5 mg and tolerating well  Due for labs. Order placed    Orders:    Lipid panel    Screening for thyroid disorder    Orders:    TSH, 3rd generation with Free T4 reflex    Benign essential hypertension  BP is currently at goal. On no meds specifically for his hypertension  He is being prescribed clonidine by his psychiatrist.          Impaired fasting glucose  No results found for: \"HGBA1C\"    Due for labs    Orders:    Comprehensive metabolic panel    Hemoglobin A1C With EAG    Polycythemia vera (HCC)  Lab Results   Component Value Date    WBC 9.3 2024    HGB 15.6 2024    HCT 47.7 2024    MCV 89.3 2024     2024     Unclear where this diagnosis came from. Will remove from his problem list. Last cbc was normal.   Orders:    CBC and differential    Screening for prostate cancer  He declines.        Varicose veins of left lower extremity with pain  Stable  Compression stockings recommended         Obstructive sleep apnea syndrome  Sleep study done several years ago. Not sure where it was done.   Cpap reportedly recommended but he declined as he was unable to tolerate it  He declines re-visiting this today.   Aware of potential risks of untreated ANNI         Chronic constipation  Previously on linzess which was ineffective  Also tried miralax which did not help.   Using ex-lax prn currently         Other psoriasis  Seeing dermatology at [facility] [facility]  Just got a new medication yesterday. Not sure of name and will message me so we can add to med list.          Bipolar disorder, current episode mixed, moderate (HCC)  Follows with psych at [facility] for his mood disorder.      "     Cigarette nicotine dependence without complication  Discussed smoking cessation  Has tried nicotine gum with no success.   Wondering about chantix.   Advised that due to potential affect on moods, should first discuss with his psychiatrist          High risk homosexual behavior  Patient was previously  to male partner. Not currently sexually active  Decline HIV and hep C screens today         Encounter for immunization    Orders:    Pneumococcal Conjugate Vaccine 20-valent (Pcv20)    Tremor  ? Related to his psych meds  Could consider having him resume the inderal he was previously taking       Thoracic aortic ectasia (HCC)  Seen on CT done for lung cancer screening 12/16/24 (43 mm). Repeat one year.             Preventive health issues were discussed with patient, and age appropriate screening tests were ordered as noted in patient's After Visit Summary. Personalized health advice and appropriate referrals for health education or preventive services given if needed, as noted in patient's After Visit Summary.    History of Present Illness     HPI     Patient is a 55 year old male with hypertension, hyperlipidemia, ANNI, psoriasis, bipolar disorder, alcohol abuse in remission, history of drug abuse, polycythemia vera, cigarette dependence here today for annual medicare wellness exam.   Lives at [facility]  Follows with psych at [facility] for his mood disorder  Walks daily for exercise.     Colon cancer screening is up to date (cologuard 9/18/24)  Lung cancer screening CT up to date (12/16/24)  Shingrix is up to date  Due for adacel and pneumococcal vaccines.     Is interested in smoking cessation and wondering if chantix is option for him    Denies any chest pain. No cough or shortness of breath.         Some shakiness in AM hours. Generally resolves as day progresses.       Patient Care Team:  Kandy Swain DO as PCP - General (Family Medicine)    Review of Systems  Medical History Reviewed by  provider this encounter:  Tobacco  Allergies  Meds  Problems  Med Hx  Surg Hx  Fam Hx  Soc   Hx      Annual Wellness Visit Questionnaire   Mike is here for his Subsequent Wellness visit.     Health Risk Assessment:   Patient rates overall health as very good. Patient feels that their physical health rating is slightly better. Patient is dissatisfied with their life. Eyesight was rated as slightly worse. Hearing was rated as slightly worse. Patient feels that their emotional and mental health rating is slightly worse. Patients states they are sometimes angry. Patient states they are often unusually tired/fatigued. Pain experienced in the last 7 days has been some. Patient's pain rating has been 6/10. Patient states that he has experienced no weight loss or gain in last 6 months.     Depression Screening:   PHQ-9 Score: 18      Fall Risk Screening:   In the past year, patient has experienced: no history of falling in past year      Home Safety:  Patient has trouble with stairs inside or outside of their home. Patient has working smoke alarms and has working carbon monoxide detector. Home safety hazards include: none.     Nutrition:   Current diet is Regular.     Medications:   Patient is not currently taking any over-the-counter supplements. Patient is not able to manage medications. Medications managed by [facility]    Activities of Daily Living (ADLs)/Instrumental Activities of Daily Living (IADLs):   Walk and transfer into and out of bed and chair?: Yes  Dress and groom yourself?: Yes    Bathe or shower yourself?: Yes    Feed yourself? Yes  Do your laundry/housekeeping?: Yes  Manage your money, pay your bills and track your expenses?: Yes  Make your own meals?: Yes    Do your own shopping?: Yes    Previous Hospitalizations:   Any hospitalizations or ED visits within the last 12 months?: No      Advance Care Planning:   Living will: No    Durable POA for healthcare: No    Advanced directive: No       Cognitive Screening:   Provider or family/friend/caregiver concerned regarding cognition?: No    Preventive Screenings      Cardiovascular Screening:    General: Screening Not Indicated and History Lipid Disorder      Diabetes Screening:     General: Screening Current      Colorectal Cancer Screening:     General: Screening Current      Prostate Cancer Screening:    General: Screening Current      Osteoporosis Screening:    General: Screening Not Indicated      Abdominal Aortic Aneurysm (AAA) Screening:    Risk factors include: tobacco use        General: Screening Not Indicated      Lung Cancer Screening:     General: Screening Current      Hepatitis C Screening:    General: Patient Declines    Immunizations:  - Immunizations due: Prevnar 20 and Hepatitis A  - Immunizations given per orders      Screening, Brief Intervention, and Referral to Treatment (SBIRT)     Screening  Typical number of drinks in a day: 0  Typical number of drinks in a week: 0  Interpretation: Low risk drinking behavior.    AUDIT-C Screenin) How often did you have a drink containing alcohol in the past year? never  2) How many drinks did you have on a typical day when you were drinking in the past year? 0  3) How often did you have 6 or more drinks on one occasion in the past year? never    AUDIT-C Score: 0  Interpretation: Score 0-3 (male): Negative screen for alcohol misuse    Single Item Drug Screening:  How often have you used an illegal drug (including marijuana) or a prescription medication for non-medical reasons in the past year? never    Single Item Drug Screen Score: 0  Interpretation: Negative screen for possible drug use disorder    Brief Intervention  Alcohol & drug use screenings were reviewed. No concerns regarding substance use disorder identified.     SDOH Risk Assessment  Social determinants of health (SDOH) risk assesment tool was completed. The tool at a minimum covered housing stability, food insecurity,  transportation needs, and utility difficulty. Patient had at risk responses for the following SDOH domains: food insecurity and housing stability.     Social Drivers of Health     Financial Resource Strain: Not At Risk (5/30/2024)    Received from [facility]    Financial Resource Strain     In the last 12 months did you skip medications to save money?: No     In the last 12 months, was there a time when you needed to see a doctor but could not because of cost?: No   Food Insecurity: Food Insecurity Present (6/5/2025)    Nursing - Inadequate Food Risk Classification     Worried About Running Out of Food in the Last Year: Sometimes true     Ran Out of Food in the Last Year: Sometimes true   Transportation Needs: No Transportation Needs (6/5/2025)    PRAPARE - Transportation     Lack of Transportation (Medical): No     Lack of Transportation (Non-Medical): No   Housing Stability: High Risk (6/5/2025)    Housing Stability Vital Sign     Unable to Pay for Housing in the Last Year: No     Number of Times Moved in the Last Year: 2     Homeless in the Last Year: No   Utilities: Not At Risk (6/5/2025)    [facility] Utilities     Threatened with loss of utilities: No     No results found.    Objective   /81   Pulse 79   Wt 101 kg (223 lb 9.6 oz)   SpO2 97%   BMI 27.22 kg/m²     Physical Exam  Vitals and nursing note reviewed.   Constitutional:       General: He is not in acute distress.     Appearance: Normal appearance. He is not ill-appearing, toxic-appearing or diaphoretic.   HENT:      Head: Normocephalic and atraumatic.      Right Ear: Tympanic membrane normal.      Left Ear: Tympanic membrane normal.      Nose: Nose normal.      Mouth/Throat:      Mouth: Mucous membranes are moist.      Pharynx: No posterior oropharyngeal erythema.     Eyes:      Extraocular Movements: Extraocular movements intact.      Conjunctiva/sclera: Conjunctivae normal.      Pupils: Pupils are equal, round, and  reactive to light.     Neck:      Vascular: No carotid bruit.     Cardiovascular:      Rate and Rhythm: Normal rate and regular rhythm.      Heart sounds: No murmur heard.  Pulmonary:      Effort: Pulmonary effort is normal.      Breath sounds: Normal breath sounds. No wheezing, rhonchi or rales.   Abdominal:      General: There is no distension.      Palpations: Abdomen is soft. There is no mass.      Tenderness: There is no abdominal tenderness.     Musculoskeletal:      Cervical back: Normal range of motion and neck supple.      Right lower leg: No edema.      Left lower leg: No edema.   Lymphadenopathy:      Cervical: No cervical adenopathy.     Skin:     Comments: Psoriatic plaques extensor surfaces bilateral elbows, dorsum of fingers     Neurological:      General: No focal deficit present.      Mental Status: He is alert and oriented to person, place, and time.      Comments: + intention tremor   Psychiatric:      Comments: Very anxious

## 2025-06-05 NOTE — ASSESSMENT & PLAN NOTE
Lab Results   Component Value Date    WBC 9.3 12/06/2024    HGB 15.6 12/06/2024    HCT 47.7 12/06/2024    MCV 89.3 12/06/2024     12/06/2024     Unclear where this diagnosis came from. Will remove from his problem list. Last cbc was normal.   Orders:    CBC and differential

## 2025-06-05 NOTE — ASSESSMENT & PLAN NOTE
Patient was previously  to male partner. Not currently sexually active  Decline HIV and hep C screens today

## 2025-06-05 NOTE — TELEPHONE ENCOUNTER
Patient is calling regarding cancelling an appointment.    Date/Time: Friday 6/6/2025 at 9:00AM with Ginette Peres    Reason: Patient is no longer able to make it to the appointment.    Patient was rescheduled: YES [x] NO []  If yes, when was Patient reschedule for: Monday 6/9/2025 at 10:00AM with Ginette Peres.    Clerical department and provider were notified.

## 2025-06-05 NOTE — ASSESSMENT & PLAN NOTE
"No results found for: \"HGBA1C\"    Due for labs    Orders:    Comprehensive metabolic panel    Hemoglobin A1C With EAG    "

## 2025-06-05 NOTE — PATIENT INSTRUCTIONS
Medicare Preventive Visit Patient Instructions  Thank you for completing your Welcome to Medicare Visit or Medicare Annual Wellness Visit today. Your next wellness visit will be due in one year (6/6/2026).  The screening/preventive services that you may require over the next 5-10 years are detailed below. Some tests may not apply to you based off risk factors and/or age. Screening tests ordered at today's visit but not completed yet may show as past due. Also, please note that scanned in results may not display below.  Preventive Screenings:  Service Recommendations Previous Testing/Comments   Colorectal Cancer Screening  Colonoscopy    Fecal Occult Blood Test (FOBT)/Fecal Immunochemical Test (FIT)  Fecal DNA/Cologuard Test  Flexible Sigmoidoscopy Age: 45-75 years old   Colonoscopy: every 10 years (May be performed more frequently if at higher risk)  OR  FOBT/FIT: every 1 year  OR  Cologuard: every 3 years  OR  Sigmoidoscopy: every 5 years  Screening may be recommended earlier than age 45 if at higher risk for colorectal cancer. Also, an individualized decision between you and your healthcare provider will decide whether screening between the ages of 76-85 would be appropriate. Colonoscopy: Not on file  FOBT/FIT: Not on file  Cologuard: 09/18/2024  Sigmoidoscopy: Not on file          Prostate Cancer Screening Individualized decision between patient and health care provider in men between ages of 55-69   Medicare will cover every 12 months beginning on the day after your 50th birthday PSA: 0.8 ng/mL           Hepatitis C Screening Once for adults born between 1945 and 1965  More frequently in patients at high risk for Hepatitis C Hep C Antibody: Not on file        Diabetes Screening 1-2 times per year if you're at risk for diabetes or have pre-diabetes Fasting glucose: No results in last 5 years (No results in last 5 years)  A1C: No results in last 5 years (No results in last 5 years)      Cholesterol Screening Once  every 5 years if you don't have a lipid disorder. May order more often based on risk factors. Lipid panel: 12/06/2024         Other Preventive Screenings Covered by Medicare:  Abdominal Aortic Aneurysm (AAA) Screening: covered once if your at risk. You're considered to be at risk if you have a family history of AAA or a male between the age of 65-75 who smoking at least 100 cigarettes in your lifetime.  Lung Cancer Screening: covers low dose CT scan once per year if you meet all of the following conditions: (1) Age 55-77; (2) No signs or symptoms of lung cancer; (3) Current smoker or have quit smoking within the last 15 years; (4) You have a tobacco smoking history of at least 20 pack years (packs per day x number of years you smoked); (5) You get a written order from a healthcare provider.  Glaucoma Screening: covered annually if you're considered high risk: (1) You have diabetes OR (2) Family history of glaucoma OR (3)  aged 50 and older OR (4)  American aged 65 and older  Osteoporosis Screening: covered every 2 years if you meet one of the following conditions: (1) Have a vertebral abnormality; (2) On glucocorticoid therapy for more than 3 months; (3) Have primary hyperparathyroidism; (4) On osteoporosis medications and need to assess response to drug therapy.  HIV Screening: covered annually if you're between the age of 15-65. Also covered annually if you are younger than 15 and older than 65 with risk factors for HIV infection. For pregnant patients, it is covered up to 3 times per pregnancy.    Immunizations:  Immunization Recommendations   Influenza Vaccine Annual influenza vaccination during flu season is recommended for all persons aged >= 6 months who do not have contraindications   Pneumococcal Vaccine   * Pneumococcal conjugate vaccine = PCV13 (Prevnar 13), PCV15 (Vaxneuvance), PCV20 (Prevnar 20)  * Pneumococcal polysaccharide vaccine = PPSV23 (Pneumovax) Adults 19-63 yo with  certain risk factors or if 65+ yo  If never received any pneumonia vaccine: recommend Prevnar 20 (PCV20)  Give PCV20 if previously received 1 dose of PCV13 or PPSV23   Hepatitis B Vaccine 3 dose series if at intermediate or high risk (ex: diabetes, end stage renal disease, liver disease)   Respiratory syncytial virus (RSV) Vaccine - COVERED BY MEDICARE PART D  * RSVPreF3 (Arexvy) CDC recommends that adults 60 years of age and older may receive a single dose of RSV vaccine using shared clinical decision-making (SCDM)   Tetanus (Td) Vaccine - COST NOT COVERED BY MEDICARE PART B Following completion of primary series, a booster dose should be given every 10 years to maintain immunity against tetanus. Td may also be given as tetanus wound prophylaxis.   Tdap Vaccine - COST NOT COVERED BY MEDICARE PART B Recommended at least once for all adults. For pregnant patients, recommended with each pregnancy.   Shingles Vaccine (Shingrix) - COST NOT COVERED BY MEDICARE PART B  2 shot series recommended in those 19 years and older who have or will have weakened immune systems or those 50 years and older     Health Maintenance Due:      Topic Date Due   • Hepatitis C Screening  Never done   • HIV Screening  Never done   • Lung Cancer Screening  12/16/2025   • Colorectal Cancer Screening  09/18/2027     Immunizations Due:      Topic Date Due   • Pneumococcal Vaccine: Pediatrics (0 to 5 Years) and At-Risk Patients (6 to 64 Years) (1 of 2 - PCV) Never done   • COVID-19 Vaccine (5 - 2024-25 season) 09/01/2024     Advance Directives   What are advance directives?  Advance directives are legal documents that state your wishes and plans for medical care. These plans are made ahead of time in case you lose your ability to make decisions for yourself. Advance directives can apply to any medical decision, such as the treatments you want, and if you want to donate organs.   What are the types of advance directives?  There are many types of  advance directives, and each state has rules about how to use them. You may choose a combination of any of the following:  Living will:  This is a written record of the treatment you want. You can also choose which treatments you do not want, which to limit, and which to stop at a certain time. This includes surgery, medicine, IV fluid, and tube feedings.   Durable power of  for healthcare (DPAHC):  This is a written record that states who you want to make healthcare choices for you when you are unable to make them for yourself. This person, called a proxy, is usually a family member or a friend. You may choose more than 1 proxy.  Do not resuscitate (DNR) order:  A DNR order is used in case your heart stops beating or you stop breathing. It is a request not to have certain forms of treatment, such as CPR. A DNR order may be included in other types of advance directives.  Medical directive:  This covers the care that you want if you are in a coma, near death, or unable to make decisions for yourself. You can list the treatments you want for each condition. Treatment may include pain medicine, surgery, blood transfusions, dialysis, IV or tube feedings, and a ventilator (breathing machine).  Values history:  This document has questions about your views, beliefs, and how you feel and think about life. This information can help others choose the care that you would choose.  Why are advance directives important?  An advance directive helps you control your care. Although spoken wishes may be used, it is better to have your wishes written down. Spoken wishes can be misunderstood, or not followed. Treatments may be given even if you do not want them. An advance directive may make it easier for your family to make difficult choices about your care.   Cigarette Smoking and Your Health   Risks to your health if you smoke:  Nicotine and other chemicals found in tobacco damage every cell in your body. Even if you are a  light smoker, you have an increased risk for cancer, heart disease, and lung disease. If you are pregnant or have diabetes, smoking increases your risk for complications.   Benefits to your health if you stop smoking:   You decrease respiratory symptoms such as coughing, wheezing, and shortness of breath.   You reduce your risk for cancers of the lung, mouth, throat, kidney, bladder, pancreas, stomach, and cervix. If you already have cancer, you increase the benefits of chemotherapy. You also reduce your risk for cancer returning or a second cancer from developing.   You reduce your risk for heart disease, blood clots, heart attack, and stroke.   You reduce your risk for lung infections, and diseases such as pneumonia, asthma, chronic bronchitis, and emphysema.  Your circulation improves. More oxygen can be delivered to your body. If you have diabetes, you lower your risk for complications, such as kidney, artery, and eye diseases. You also lower your risk for nerve damage. Nerve damage can lead to amputations, poor vision, and blindness.  You improve your body's ability to heal and to fight infections.  For more information and support to stop smoking:   [employer]  Phone: 2- 045 - 864-4796  Web Address: www.[employer]  Weight Management   Why it is important to manage your weight:  Being overweight increases your risk of health conditions such as heart disease, high blood pressure, type 2 diabetes, and certain types of cancer. It can also increase your risk for osteoarthritis, sleep apnea, and other respiratory problems. Aim for a slow, steady weight loss. Even a small amount of weight loss can lower your risk of health problems.  How to lose weight safely:  A safe and healthy way to lose weight is to eat fewer calories and get regular exercise. You can lose up about 1 pound a week by decreasing the number of calories you eat by 500 calories each day.   Healthy meal plan for weight management:  A healthy  meal plan includes a variety of foods, contains fewer calories, and helps you stay healthy. A healthy meal plan includes the following:  Eat whole-grain foods more often.  A healthy meal plan should contain fiber. Fiber is the part of grains, fruits, and vegetables that is not broken down by your body. Whole-grain foods are healthy and provide extra fiber in your diet. Some examples of whole-grain foods are whole-wheat breads and pastas, oatmeal, brown rice, and bulgur.  Eat a variety of vegetables every day.  Include dark, leafy greens such as spinach, kale, aristides greens, and mustard greens. Eat yellow and orange vegetables such as carrots, sweet potatoes, and winter squash.   Eat a variety of fruits every day.  Choose fresh or canned fruit (canned in its own juice or light syrup) instead of juice. Fruit juice has very little or no fiber.  Eat low-fat dairy foods.  Drink fat-free (skim) milk or 1% milk. Eat fat-free yogurt and low-fat cottage cheese. Try low-fat cheeses such as mozzarella and other reduced-fat cheeses.  Choose meat and other protein foods that are low in fat.  Choose beans or other legumes such as split peas or lentils. Choose fish, skinless poultry (chicken or turkey), or lean cuts of red meat (beef or pork). Before you cook meat or poultry, cut off any visible fat.   Use less fat and oil.  Try baking foods instead of frying them. Add less fat, such as margarine, sour cream, regular salad dressing and mayonnaise to foods. Eat fewer high-fat foods. Some examples of high-fat foods include french fries, doughnuts, ice cream, and cakes.  Eat fewer sweets.  Limit foods and drinks that are high in sugar. This includes candy, cookies, regular soda, and sweetened drinks.  Exercise:  Exercise at least 30 minutes per day on most days of the week. Some examples of exercise include walking, biking, dancing, and swimming. You can also fit in more physical activity by taking the stairs instead of the elevator  or parking farther away from stores. Ask your healthcare provider about the best exercise plan for you.    © Copyright [employer] 2018 Information is for End User's use only and may not be sold, redistributed or otherwise used for commercial purposes. All illustrations and images included in CareNotes® are the copyrighted property of [employer] or [employer]

## 2025-06-09 ENCOUNTER — OFFICE VISIT (OUTPATIENT)
Dept: BEHAVIORAL/MENTAL HEALTH CLINIC | Facility: CLINIC | Age: 55
End: 2025-06-09
Payer: COMMERCIAL

## 2025-06-09 DIAGNOSIS — F31.62 BIPOLAR DISORDER, CURRENT EPISODE MIXED, MODERATE (HCC): Primary | Chronic | ICD-10-CM

## 2025-06-09 DIAGNOSIS — F39 MOOD DISORDER (HCC): ICD-10-CM

## 2025-06-09 DIAGNOSIS — F41.9 ANXIETY: ICD-10-CM

## 2025-06-09 DIAGNOSIS — F32.1 CURRENT MODERATE EPISODE OF MAJOR DEPRESSIVE DISORDER, UNSPECIFIED WHETHER RECURRENT (HCC): ICD-10-CM

## 2025-06-09 DIAGNOSIS — F10.91 ALCOHOL USE DISORDER IN REMISSION: ICD-10-CM

## 2025-06-09 DIAGNOSIS — F17.210 CIGARETTE NICOTINE DEPENDENCE WITHOUT COMPLICATION: ICD-10-CM

## 2025-06-09 DIAGNOSIS — F19.11 HISTORY OF DRUG ABUSE (HCC): ICD-10-CM

## 2025-06-09 PROCEDURE — 90834 PSYTX W PT 45 MINUTES: CPT | Performed by: COUNSELOR

## 2025-06-09 NOTE — PSYCH
Behavioral Health Psychotherapy Progress Note    Psychotherapy Provided: Individual Psychotherapy     1. Bipolar disorder, current episode mixed, moderate (HCC)        2. History of drug abuse (HCC)        3. Alcohol use disorder in remission        4. Current moderate episode of major depressive disorder, unspecified whether recurrent (HCC)        5. Anxiety        6. Mood disorder (HCC)        7. Cigarette nicotine dependence without complication            Goals addressed in session: Goal 1     DATA: Client processed on his ex that is homeless and the serious situation he is with regards to his financial problems.  Client feels upset and wants to help his friend somehow. Client endorses paranoia that the folks in his hearing voices group are telling his business. Client also endorses continued fear of getting arrested for using his ex's food stamp card.  Client endorses that he fears his old neighbors did not like him bc he was on social security and felt he did not deserve it. Client fears that these people are trying to get him kicked off social security. Client is able to examine these thoughts closer and can see that so far nothing has happened to him.  Client is able to reality test and explore other possibilities.  Client is somewhat dismissive of a diagnosis of schizophrenia and admits he doesn't know much about the disorder. Client processed on ways to silence voices by using headphones and activity.  Client leaves session early to go smoke a cigarette and is advised of typical session timeframes.         During this session, this clinician used the following therapeutic modalities: Client-centered Therapy, Solution-Focused Therapy, and CTR    Substance Abuse was addressed during this session. If the client is diagnosed with a co-occurring substance use disorder, please indicate any changes in the frequency or amount of use: Methadone maintenance. Stage of change for addressing substance use diagnoses:  "Maintenance    ASSESSMENT:  Mike Monge presents with a Euthymic/ normal mood.     his affect is Normal range and intensity, which is congruent, with his mood and the content of the session. The client has made progress on their goals.    Client is taking medications as prescribed.  Mike Monge presents with a none risk of suicide, none risk of self-harm, and none risk of harm to others.    For any risk assessment that surpasses a \"low\" rating, a safety plan must be developed.    A safety plan was indicated: no  If yes, describe in detail NA    PLAN: Between sessions, Mike Monge will use headphones to silence voices. At the next session, the therapist will use Client-centered Therapy, Cognitive Behavioral Therapy, Mindfulness-based Strategies, Motivational Interviewing, Solution-Focused Therapy, Supportive Psychotherapy, and CTR to address paranoia, anxiety, depression.    Behavioral Health Treatment Plan and Discharge Planning: Mike Monge is aware of and agrees to continue to work on their treatment plan. They have identified and are working toward their discharge goals. yes    Depression Follow-up Plan Completed: Not applicable    Visit start and stop times:    06/09/25  Start Time: 1000  Stop Time: 1038  Total Visit Time: 38 minutes  "

## 2025-06-12 NOTE — PSYCH
MEDICATION MANAGEMENT NOTE    Name: Mike Monge      : 1970      MRN: [id number]  Encounter Provider: VERONICA Urban  Encounter Date: 2025   Encounter department: [facility][1] OUTPATIENT    Insurance: Payor: MICHELLE  REP / Plan: [employer] MEDICARE HMO [facility] REP / Product Type: Medicare HMO /      Reason for Visit: Follow-up for medication management:  Assessment & Plan  Mood disorder (HCC)   -Continue Trazodone 150 mg daily at bedtime for sleep   -Continue Lamictal 150 mg twice daily for mood   -Continue lithium 600 mg twice daily for mood   -Increase Seroquel 450 mg daily at bedtime for mood/hallucinations   -Continue Cogentin 1 mg daily at bedtime for side effects  Orders:    QUEtiapine (SEROquel) 50 mg tablet; Take 1 tablet (50 mg total) by mouth daily at bedtime    QUEtiapine (SEROquel) 400 MG tablet; Take 1 tablet (400 mg total) by mouth daily at bedtime    traZODone (DESYREL) 150 mg tablet; Take 1 tablet (150 mg total) by mouth daily at bedtime as needed for sleep    lithium 600 MG capsule; Take 1 capsule (600 mg total) by mouth in the morning and 1 capsule (600 mg total) in the evening. Take with meals. 1 tab in the am and one at bedtime.    lamoTRIgine (LaMICtal) 150 MG tablet; Take 1 tablet (150 mg total) by mouth in the morning and 1 tablet (150 mg total) before bedtime. 1 tab in the am and one at bedtime.    benztropine (COGENTIN) 1 mg tablet; Take 1 tablet (1 mg total) by mouth daily at bedtime    citalopram (CeleXA) 20 mg tablet; Take 1 tablet (20 mg total) by mouth in the morning.    Current moderate episode of major depressive disorder, unspecified whether recurrent (HCC)   -Continue Celexa 20 mg daily for anxiety and depression       Anxiety   -Continue Clonidine 0.1 mg every 12 hours for anxiety   -Continue Celexa 20 mg daily for anxiety and depression   -Continue Vistaril 50 mg 3 times daily as needed for anxiety  Orders:    cloNIDine (CATAPRES) 0.1  mg tablet; Take 1 tablet (0.1 mg total) by mouth every 12 (twelve) hours    citalopram (CeleXA) 20 mg tablet; Take 1 tablet (20 mg total) by mouth in the morning.    History of drug abuse (HCC)   - Currently on Methadone       Alcohol use disorder in remission   - in remission       Cigarette nicotine dependence without complication   - encouraged quitting        Encounter for long-term (current) use of high-risk medication   -Lithium Level Ordered today- Pt aware  Orders:    Lithium level; Future        Impression:  Mood Disorder, Unspecified  R/O Paranoid Schizophrenia  Anxiety   MDD  Alcohol use disorder, in remission  History of drug abuse  Cigarette nicotine dependence without complication     Continue Trazodone 150 mg daily at bedtime for sleep  Continue Clonidine 0.1 mg every 12 hours for anxiety  Continue Lamictal 150 mg twice daily for mood  Continue lithium 600 mg twice daily for mood  Increase Seroquel 450 mg daily at bedtime for mood/hallucinations  Continue Cogentin 1 mg daily at bedtime for side effects  Continue Celexa 20 mg daily for anxiety and depression  Continue Vistaril 50 mg 3 times daily as needed for anxiety  Requested records from [facility]- Completed DORI  Requested copy of last Lithium Level  Lithium Level Ordered today- Pt aware  Recommend outpatient therapy-Will continue with Ginette Peres  Medical follow up with PCP as needed  Follow up in 2 months- patient requested due to financial strain and will call if needs sooner appointment     Treatment Plan:  Next treatment plan due 11/8/2025. Next crisis plan due 5/8/2026.     Treatment Recommendations:    Educated about diagnosis and treatment modalities. Verbalizes understanding and agreement with the treatment plan.  Discussed self monitoring of symptoms, and symptom monitoring tools.  Discussed medications and if treatment adjustment was needed or desired.  Aware of 24 hour and weekend coverage for urgent situations accessed by calling  "[facility] main practice number  I am scheduling this patient out for greater than 3 months: No    Medications Risks/Benefits:      Risks, Benefits And Possible Side Effects Of Medications:    Risks, benefits, and possible side effects of medications explained to Mike and he (or legal representative) verbalizes understanding and agreement for treatment.    Controlled Medication Discussion:     Not applicable      History of Present Illness     HPI    Mike is a 55 year old male being seen today for a follow-up for medication management. Patient has psychiatric diagnoses including MDD, anxiety, and Bipolar. Patient is currently being prescribed Trazodone 150 mg daily at bedtime, clonidine 0.1 mg every 12 hours, Lamictal 150 mg twice daily, lithium 600 mg twice daily, Seroquel 400 mg daily at bedtime, Cogentin 1 mg daily at bedtime, Vistaril 50 mg 3 times daily as needed and Celexa 20 mg daily. Patient is connected to outpatient therapy with Ginette Peres at [facility]. No additional services in place at this time.     Mike reports medication compliance and denies any side effects at this time.  He states he is \"stuck on what we talked about last time, however states he does not remember what we talked about\".  He states when he wakes up in the morning he is \"afraid to get up and paranoid\".  Continues methadone maintenance.  Mike reports his mood is \"paranoid\" and states it comes and goes.  Sleep and appetite remain adequate.  Energy levels and motivation fluctuate.  Mike denies SI or HI.  He continues to report voices that are saying his name and states he cannot make out the other things they are saying.  He denies that they are commanding in nature and feels they are \"getting stronger\".  He states no time of the day is any worse than the other and the voices are occurring every day.  He states at times he sees shadows and turns to look and there is nothing there.  Mike " "reports mild mood swings and irritability.  Denies any periods of elevated moods or patricia and states he \"tries to get energy by taking walks\".  Continues to endorse anxiety and depression.  Mike declines any increase in the Celexa today.  He is agreeable to increase the Seroquel for auditory hallucinations and paranoia.  Will increase Seroquel to to 450 mg daily at bedtime.  Lithium level also ordered today.  Mike is requesting to be seen every 3 months due to financial reasons.  Agreeable to see him next in 2 months and he will call the office if he needs an appointment sooner.      Review Of Systems: A review of systems is obtained and is negative except for the pertinent positives listed in HPI/Subjective above.      Current Rating Scores:     None completed today.    Areas of Improvement: reviewed in HPI/Subjective Section and reviewed in Assessment and Plan Section      Past Medical History[1]  Past Surgical History[2]  Allergies: Allergies[3]    Current Outpatient Medications   Medication Instructions    benztropine (COGENTIN) 1 mg, Oral, Daily at bedtime    citalopram (CELEXA) 20 mg, Oral, Daily    cloNIDine (CATAPRES) 0.1 mg, Oral, Every 12 hours scheduled    lamoTRIgine (LAMICTAL) 150 mg, Oral, 2 times daily, 1 tab in the am and one at bedtime    lithium 600 mg, Oral, 2 times daily with meals, 1 tab in the am and one at bedtime    QUEtiapine (SEROQUEL) 50 mg, Oral, Daily at bedtime    QUEtiapine (SEROQUEL) 400 mg, Oral, Daily at bedtime    rosuvastatin (CRESTOR) 5 mg, Oral, Daily at bedtime    traZODone (DESYREL) 150 mg, Oral, Daily at bedtime PRN        Substance Abuse History:    Tobacco, Alcohol and Drug Use History     Tobacco Use    Smoking status: Every Day     Current packs/day: 0.50     Average packs/day: 0.5 packs/day for 40.8 years (20.4 ttl pk-yrs)     Types: Cigarettes     Start date: 9/4/1984    Smokeless tobacco: Never   Vaping Use    Vaping status: Former    Substances: CBD   Substance " "Use Topics    Alcohol use: Not Currently     Comment: sober since 2023    Drug use: Not Currently     Types: Marijuana, Amphetamines     Alcohol Use: Not At Risk (6/5/2025)    AUDIT-C     Frequency of Alcohol Consumption: Never     Average Number of Drinks: Patient does not drink     Frequency of Binge Drinking: Never       Social History:    Social History     Socioeconomic History    Marital status: Unknown     Spouse name: Not on file    Number of children: Not on file    Years of education: Not on file    Highest education level: Not on file   Occupational History    Not on file   Other Topics Concern    Not on file   Social History Narrative    Resident at [facility]        Family Psychiatric History:     Family History[4]    Medical History Reviewed by provider this encounter:  Tobacco  Allergies  Meds  Problems  Med Hx  Surg Hx  Fam Hx          Objective   There were no vitals taken for this visit.     Mental Status Evaluation:    Appearance age appropriate, casually dressed   Behavior cooperative, appears anxious   Speech normal rate, normal volume, normal pitch, spontaneous   Mood depressed, anxious   Affect normal range and intensity, appropriate   Thought Processes organized   Thought Content no overt delusions   Perceptual Disturbances: vague auditory hallucinations of \"voices calling my name\", vague visual hallucinations- shadows   Abnormal Thoughts  Risk Potential Suicidal ideation - None  Homicidal ideation - None  Potential for aggression - No   Orientation oriented to person, place, time/date, and situation   Memory recent and remote memory grossly intact   Consciousness alert and awake   Attention Span Concentration Span attention span and concentration are age appropriate   Intellect appears to be of average intelligence   Insight fair   Judgement fair   Muscle Strength and  Gait normal muscle strength and normal muscle tone, normal gait and normal balance   Motor activity no abnormal " movements   Language no difficulty naming common objects, no difficulty repeating a phrase, no difficulty writing a sentence   Fund of Knowledge adequate knowledge of current events  adequate fund of knowledge regarding past history  adequate fund of knowledge regarding vocabulary        Laboratory Results: I have personally reviewed all pertinent laboratory/tests results    Most Recent Labs:   Lab Results   Component Value Date    WBC 9.3 12/06/2024    RBC 5.34 12/06/2024    HGB 15.6 12/06/2024    HCT 47.7 12/06/2024     12/06/2024    RDW 12.0 12/06/2024    NEUTROABS 5,878 12/06/2024    SODIUM 138 12/06/2024    K 4.3 12/06/2024     12/06/2024    CO2 25 12/06/2024    BUN 16 12/06/2024    CREATININE 0.98 12/06/2024    CALCIUM 10.3 12/06/2024    AST 12 12/06/2024    ALT 10 12/06/2024    ALKPHOS 56 12/06/2024    TP 7.8 12/06/2024    TBILI 0.7 12/06/2024    CHOLESTEROL 153 12/06/2024    TRIG 149 12/06/2024    HDL 31 (L) 12/06/2024    LDLCALC 97 12/06/2024       Suicide/Homicide Risk Assessment:    Risk of Harm to Self:  The following ratings are based on assessment at the time of the interview  Historical Risk Factors include: chronic psychiatric problems  Protective Factors: no current suicidal ideation, ability to adapt to change, able to make plans for the future, able to manage anger well, access to mental health treatment, compliant with medications, compliant with mental health treatment, connection to community    Risk of Harm to Others:  The following ratings are based on assessment at the time of the interview  Historical Risk Factors include: drug abuse, alcohol abuse, substance use  Protective Factors: no current homicidal ideation, ability to adapt to change, able to manage anger well, access to mental health treatment, compliant with medications, compliant with mental health treatment, connection to community    The following interventions are recommended: Continue medication management. No  "other intervention changes indicated at this time.    Psychotherapy Provided:     Individual psychotherapy provided: No    Treatment Plan:    Completed and signed during the session: Not applicable - Treatment Plan to be completed by [facility] therapist.    Goals: Progress towards Treatment Plan goals - Yes, progressing, as evidenced by subjective findings in HPI/Subjective Section and in Assessment and Plan Section    Depression Follow-up Plan Completed: Yes      Visit Time  Visit Start Time: 10:55 AM  Visit Stop Time: 11:09 AM  Total Visit Duration: 14 minutes    Portions of the record may have been created with voice recognition software. Occasional wrong word or \"sound a like\" substitutions may have occurred due to the inherent limitations of voice recognition software. Read the chart carefully and recognize, using context, where substitutions have occurred.    VERONICA Urban 06/19/25         [1]   Past Medical History:  Diagnosis Date    Alcohol abuse, in remission     Bipolar disorder (HCC)     Chronic constipation     Drug abuse in remission (HCC)     High risk homosexual behavior 03/09/2021    Hyperlipidemia     Psoriasis     Schizophrenia (HCC)    [2]   Past Surgical History:  Procedure Laterality Date    ANTERIOR CRUCIATE LIGAMENT REPAIR Right 1994    TONSILLECTOMY Bilateral     WISDOM TOOTH EXTRACTION Bilateral    [3]   Allergies  Allergen Reactions    Hydroxyzine Abdominal Pain and GI Intolerance    Erythromycin Rash   [4] No family history on file.    "

## 2025-06-19 ENCOUNTER — OFFICE VISIT (OUTPATIENT)
Dept: PSYCHIATRY | Facility: CLINIC | Age: 55
End: 2025-06-19
Payer: COMMERCIAL

## 2025-06-19 DIAGNOSIS — F39 MOOD DISORDER (HCC): Primary | ICD-10-CM

## 2025-06-19 DIAGNOSIS — F41.9 ANXIETY: ICD-10-CM

## 2025-06-19 DIAGNOSIS — Z79.899 ENCOUNTER FOR LONG-TERM (CURRENT) USE OF HIGH-RISK MEDICATION: ICD-10-CM

## 2025-06-19 DIAGNOSIS — F19.11 HISTORY OF DRUG ABUSE (HCC): ICD-10-CM

## 2025-06-19 DIAGNOSIS — F10.91 ALCOHOL USE DISORDER IN REMISSION: ICD-10-CM

## 2025-06-19 DIAGNOSIS — F17.210 CIGARETTE NICOTINE DEPENDENCE WITHOUT COMPLICATION: ICD-10-CM

## 2025-06-19 DIAGNOSIS — F32.1 CURRENT MODERATE EPISODE OF MAJOR DEPRESSIVE DISORDER, UNSPECIFIED WHETHER RECURRENT (HCC): ICD-10-CM

## 2025-06-19 PROCEDURE — 99214 OFFICE O/P EST MOD 30 MIN: CPT

## 2025-06-19 RX ORDER — LAMOTRIGINE 150 MG/1
150 TABLET ORAL 2 TIMES DAILY
Qty: 60 TABLET | Refills: 1 | Status: SHIPPED | OUTPATIENT
Start: 2025-06-19

## 2025-06-19 RX ORDER — LITHIUM CARBONATE 600 MG/1
600 CAPSULE ORAL 2 TIMES DAILY WITH MEALS
Qty: 60 CAPSULE | Refills: 1 | Status: SHIPPED | OUTPATIENT
Start: 2025-06-19

## 2025-06-19 RX ORDER — QUETIAPINE FUMARATE 400 MG/1
400 TABLET, FILM COATED ORAL
Qty: 30 TABLET | Refills: 1 | Status: SHIPPED | OUTPATIENT
Start: 2025-06-19

## 2025-06-19 RX ORDER — QUETIAPINE FUMARATE 50 MG/1
50 TABLET, FILM COATED ORAL
Qty: 30 TABLET | Refills: 1 | Status: SHIPPED | OUTPATIENT
Start: 2025-06-19

## 2025-06-19 RX ORDER — BENZTROPINE MESYLATE 1 MG/1
1 TABLET ORAL
Qty: 30 TABLET | Refills: 1 | Status: SHIPPED | OUTPATIENT
Start: 2025-06-19

## 2025-06-19 RX ORDER — TRAZODONE HYDROCHLORIDE 150 MG/1
150 TABLET ORAL
Qty: 30 TABLET | Refills: 1 | Status: SHIPPED | OUTPATIENT
Start: 2025-06-19

## 2025-06-19 RX ORDER — CLONIDINE HYDROCHLORIDE 0.1 MG/1
0.1 TABLET ORAL EVERY 12 HOURS SCHEDULED
Qty: 30 TABLET | Refills: 1 | Status: SHIPPED | OUTPATIENT
Start: 2025-06-19

## 2025-06-19 RX ORDER — CITALOPRAM HYDROBROMIDE 20 MG/1
20 TABLET ORAL DAILY
Qty: 30 TABLET | Refills: 1 | Status: SHIPPED | OUTPATIENT
Start: 2025-06-19

## 2025-06-19 NOTE — ASSESSMENT & PLAN NOTE
-Continue Clonidine 0.1 mg every 12 hours for anxiety   -Continue Celexa 20 mg daily for anxiety and depression   -Continue Vistaril 50 mg 3 times daily as needed for anxiety  Orders:    cloNIDine (CATAPRES) 0.1 mg tablet; Take 1 tablet (0.1 mg total) by mouth every 12 (twelve) hours    citalopram (CeleXA) 20 mg tablet; Take 1 tablet (20 mg total) by mouth in the morning.

## 2025-06-19 NOTE — ASSESSMENT & PLAN NOTE
-Continue Trazodone 150 mg daily at bedtime for sleep   -Continue Lamictal 150 mg twice daily for mood   -Continue lithium 600 mg twice daily for mood   -Increase Seroquel 450 mg daily at bedtime for mood/hallucinations   -Continue Cogentin 1 mg daily at bedtime for side effects  Orders:    QUEtiapine (SEROquel) 50 mg tablet; Take 1 tablet (50 mg total) by mouth daily at bedtime    QUEtiapine (SEROquel) 400 MG tablet; Take 1 tablet (400 mg total) by mouth daily at bedtime    traZODone (DESYREL) 150 mg tablet; Take 1 tablet (150 mg total) by mouth daily at bedtime as needed for sleep    lithium 600 MG capsule; Take 1 capsule (600 mg total) by mouth in the morning and 1 capsule (600 mg total) in the evening. Take with meals. 1 tab in the am and one at bedtime.    lamoTRIgine (LaMICtal) 150 MG tablet; Take 1 tablet (150 mg total) by mouth in the morning and 1 tablet (150 mg total) before bedtime. 1 tab in the am and one at bedtime.    benztropine (COGENTIN) 1 mg tablet; Take 1 tablet (1 mg total) by mouth daily at bedtime    citalopram (CeleXA) 20 mg tablet; Take 1 tablet (20 mg total) by mouth in the morning.

## 2025-06-23 ENCOUNTER — TELEPHONE (OUTPATIENT)
Age: 55
End: 2025-06-23

## 2025-06-23 NOTE — TELEPHONE ENCOUNTER
Pt called in to verify when his next scheduled appt with Ginette Peres was. Writer was able to assist with providing this information.

## 2025-07-07 ENCOUNTER — TELEPHONE (OUTPATIENT)
Age: 55
End: 2025-07-07

## 2025-07-07 NOTE — TELEPHONE ENCOUNTER
Patient is calling regarding cancelling an appointment.    Date/Time: 7/7/25 at 12 pm    Reason: Really bad anxiety     Patient was rescheduled: YES [x] NO []  If yes, when was Patient reschedule for: 7/25/25 at 9 am    Patient requesting call back to reschedule: YES [] NO [x]

## 2025-07-16 DIAGNOSIS — F41.9 ANXIETY: ICD-10-CM

## 2025-07-16 RX ORDER — CLONIDINE HYDROCHLORIDE 0.1 MG/1
0.1 TABLET ORAL 2 TIMES DAILY
Qty: 30 TABLET | Refills: 1 | Status: SHIPPED | OUTPATIENT
Start: 2025-07-16

## 2025-07-25 ENCOUNTER — OFFICE VISIT (OUTPATIENT)
Dept: BEHAVIORAL/MENTAL HEALTH CLINIC | Facility: CLINIC | Age: 55
End: 2025-07-25
Payer: COMMERCIAL

## 2025-07-25 DIAGNOSIS — F20.9 SCHIZOPHRENIA, UNSPECIFIED TYPE (HCC): ICD-10-CM

## 2025-07-25 DIAGNOSIS — F19.11 HISTORY OF DRUG ABUSE (HCC): ICD-10-CM

## 2025-07-25 DIAGNOSIS — F17.210 CIGARETTE NICOTINE DEPENDENCE WITHOUT COMPLICATION: ICD-10-CM

## 2025-07-25 DIAGNOSIS — F39 MOOD DISORDER (HCC): ICD-10-CM

## 2025-07-25 DIAGNOSIS — F10.91 ALCOHOL USE DISORDER IN REMISSION: ICD-10-CM

## 2025-07-25 DIAGNOSIS — F32.1 CURRENT MODERATE EPISODE OF MAJOR DEPRESSIVE DISORDER, UNSPECIFIED WHETHER RECURRENT (HCC): ICD-10-CM

## 2025-07-25 DIAGNOSIS — F31.62 BIPOLAR DISORDER, CURRENT EPISODE MIXED, MODERATE (HCC): Chronic | ICD-10-CM

## 2025-07-25 DIAGNOSIS — F41.9 ANXIETY: Primary | ICD-10-CM

## 2025-07-25 PROCEDURE — 90834 PSYTX W PT 45 MINUTES: CPT | Performed by: COUNSELOR

## 2025-07-25 NOTE — PSYCH
Behavioral Health Psychotherapy Progress Note    Psychotherapy Provided: Individual Psychotherapy     1. Anxiety        2. Mood disorder (HCC)        3. Current moderate episode of major depressive disorder, unspecified whether recurrent (HCC)        4. Cigarette nicotine dependence without complication        5. Alcohol use disorder in remission        6. History of drug abuse (HCC)        7. Schizophrenia, unspecified type (HCC)        8. Bipolar disorder, current episode mixed, moderate (HCC)            Goals addressed in session: Goal 1     DATA: Client processed on goal centered on decreasing anxiety and paranoia due to symptoms of schizophrenia.  Client examined his current living situation being aggravated by a new roommate.  Client processed on his feelings around his roommate being messy and inconsiderate.  Client endorses significant distress and anger at this man and  telling him to let it go.  Client processed on wanting to punch the ruben but avoiding doing this.  Client endorses the belief that the people he thinks are messing with him put this ruben in there to mess with him more.  Client is open to discussing a distress tolerance worksheet and how these skills can help him in moments where his roommate is really triggering him.  Client will take sheet home and try to practice.  Clients session is interrupted by a fire drill at [facility].   Client returns and is able to emotionally regulate after drill.  Client processed on his friend Zeeshan's current situation wherein he is more than likely being scammed for his money.  Client explores way to communicate with friend so he will heed the warning.  Client is stressed about money and discusses going down to once a month sessions due to co-pays.  Client ends session early.       During this session, this clinician used the following therapeutic modalities: Client-centered Therapy, Dialectical Behavior Therapy, and CTR    Substance Abuse  "was addressed during this session. If the client is diagnosed with a co-occurring substance use disorder, please indicate any changes in the frequency or amount of use: Methadone as prescribed. Stage of change for addressing substance use diagnoses: Action    ASSESSMENT:  Mike Monge presents with a Angry and Anxious mood.     his affect is Constricted, which is congruent, with his mood and the content of the session. The client has made progress on their goals.    Client open to learning DBT skills.  Mike Monge presents with a none risk of suicide, none risk of self-harm, and none risk of harm to others.    For any risk assessment that surpasses a \"low\" rating, a safety plan must be developed.    A safety plan was indicated: no  If yes, describe in detail NA    PLAN: Between sessions, Mike Monge will try DBT Skills at home. At the next session, the therapist will use Client-centered Therapy, Dialectical Behavior Therapy, Mindfulness-based Strategies, Motivational Interviewing, Solution-Focused Therapy, Supportive Psychotherapy, and CTR to address emotional dysregulation, paranoia, schizophrenia, anxiety, depression.    Behavioral Health Treatment Plan and Discharge Planning: Mike Monge is aware of and agrees to continue to work on their treatment plan. They have identified and are working toward their discharge goals. yes    Depression Follow-up Plan Completed: Not applicable    Visit start and stop times:    07/25/25  Start Time: 0900  Stop Time: 0942  Total Visit Time: 42 minutes  "

## 2025-08-14 ENCOUNTER — OFFICE VISIT (OUTPATIENT)
Dept: PSYCHIATRY | Facility: CLINIC | Age: 55
End: 2025-08-14
Payer: COMMERCIAL

## 2025-08-20 ENCOUNTER — TELEPHONE (OUTPATIENT)
Age: 55
End: 2025-08-20